# Patient Record
Sex: FEMALE | Race: ASIAN | NOT HISPANIC OR LATINO
[De-identification: names, ages, dates, MRNs, and addresses within clinical notes are randomized per-mention and may not be internally consistent; named-entity substitution may affect disease eponyms.]

---

## 2019-01-03 ENCOUNTER — RECORDS - HEALTHEAST (OUTPATIENT)
Dept: ADMINISTRATIVE | Facility: OTHER | Age: 54
End: 2019-01-03

## 2019-04-25 ENCOUNTER — RECORDS - HEALTHEAST (OUTPATIENT)
Dept: ADMINISTRATIVE | Facility: OTHER | Age: 54
End: 2019-04-25

## 2019-05-09 ENCOUNTER — RECORDS - HEALTHEAST (OUTPATIENT)
Dept: ADMINISTRATIVE | Facility: OTHER | Age: 54
End: 2019-05-09

## 2019-05-10 ENCOUNTER — RECORDS - HEALTHEAST (OUTPATIENT)
Dept: ADMINISTRATIVE | Facility: OTHER | Age: 54
End: 2019-05-10

## 2019-12-10 ENCOUNTER — OFFICE VISIT - HEALTHEAST (OUTPATIENT)
Dept: FAMILY MEDICINE | Facility: CLINIC | Age: 54
End: 2019-12-10

## 2019-12-10 DIAGNOSIS — M54.12 RIGHT CERVICAL RADICULOPATHY: ICD-10-CM

## 2019-12-10 DIAGNOSIS — I10 BENIGN ESSENTIAL HYPERTENSION: ICD-10-CM

## 2019-12-10 DIAGNOSIS — J30.89 NON-SEASONAL ALLERGIC RHINITIS, UNSPECIFIED TRIGGER: ICD-10-CM

## 2019-12-10 RX ORDER — TRAZODONE HYDROCHLORIDE 50 MG/1
TABLET, FILM COATED ORAL
Refills: 5 | Status: SHIPPED | COMMUNITY
Start: 2019-05-17 | End: 2022-02-02

## 2019-12-10 ASSESSMENT — MIFFLIN-ST. JEOR: SCORE: 1113.41

## 2019-12-11 ENCOUNTER — RECORDS - HEALTHEAST (OUTPATIENT)
Dept: ADMINISTRATIVE | Facility: OTHER | Age: 54
End: 2019-12-11

## 2020-02-11 ENCOUNTER — OFFICE VISIT - HEALTHEAST (OUTPATIENT)
Dept: FAMILY MEDICINE | Facility: CLINIC | Age: 55
End: 2020-02-11

## 2020-04-16 ENCOUNTER — OFFICE VISIT - HEALTHEAST (OUTPATIENT)
Dept: FAMILY MEDICINE | Facility: CLINIC | Age: 55
End: 2020-04-16

## 2020-04-16 DIAGNOSIS — R10.13 EPIGASTRIC PAIN: ICD-10-CM

## 2020-04-16 DIAGNOSIS — K59.04 CHRONIC IDIOPATHIC CONSTIPATION: ICD-10-CM

## 2020-04-16 RX ORDER — SUCRALFATE ORAL 1 G/10ML
1 SUSPENSION ORAL 4 TIMES DAILY
Qty: 420 ML | Refills: 1 | Status: SHIPPED | OUTPATIENT
Start: 2020-04-16 | End: 2022-02-02

## 2020-04-17 ENCOUNTER — COMMUNICATION - HEALTHEAST (OUTPATIENT)
Dept: FAMILY MEDICINE | Facility: CLINIC | Age: 55
End: 2020-04-17

## 2020-04-23 ENCOUNTER — OFFICE VISIT - HEALTHEAST (OUTPATIENT)
Dept: FAMILY MEDICINE | Facility: CLINIC | Age: 55
End: 2020-04-23

## 2020-04-23 ENCOUNTER — COMMUNICATION - HEALTHEAST (OUTPATIENT)
Dept: SCHEDULING | Facility: CLINIC | Age: 55
End: 2020-04-23

## 2020-04-23 DIAGNOSIS — R35.0 URINARY FREQUENCY: ICD-10-CM

## 2020-04-23 DIAGNOSIS — R10.13 EPIGASTRIC PAIN: ICD-10-CM

## 2020-04-23 DIAGNOSIS — R11.0 NAUSEA: ICD-10-CM

## 2020-04-23 LAB
ALBUMIN SERPL-MCNC: 4.5 G/DL (ref 3.5–5)
ALBUMIN UR-MCNC: NEGATIVE MG/DL
ALP SERPL-CCNC: 96 U/L (ref 45–120)
ALT SERPL W P-5'-P-CCNC: 26 U/L (ref 0–45)
ANION GAP SERPL CALCULATED.3IONS-SCNC: 10 MMOL/L (ref 5–18)
APPEARANCE UR: CLEAR
AST SERPL W P-5'-P-CCNC: 22 U/L (ref 0–40)
BACTERIA #/AREA URNS HPF: ABNORMAL HPF
BASOPHILS # BLD AUTO: 0.1 THOU/UL (ref 0–0.2)
BASOPHILS NFR BLD AUTO: 1 % (ref 0–2)
BILIRUB SERPL-MCNC: 0.7 MG/DL (ref 0–1)
BILIRUB UR QL STRIP: NEGATIVE
BUN SERPL-MCNC: 11 MG/DL (ref 8–22)
CALCIUM SERPL-MCNC: 9.7 MG/DL (ref 8.5–10.5)
CHLORIDE BLD-SCNC: 105 MMOL/L (ref 98–107)
CO2 SERPL-SCNC: 27 MMOL/L (ref 22–31)
COLOR UR AUTO: YELLOW
CREAT SERPL-MCNC: 0.71 MG/DL (ref 0.6–1.1)
EOSINOPHIL # BLD AUTO: 0.1 THOU/UL (ref 0–0.4)
EOSINOPHIL NFR BLD AUTO: 2 % (ref 0–6)
ERYTHROCYTE [DISTWIDTH] IN BLOOD BY AUTOMATED COUNT: 12.5 % (ref 11–14.5)
GFR SERPL CREATININE-BSD FRML MDRD: >60 ML/MIN/1.73M2
GLUCOSE BLD-MCNC: 87 MG/DL (ref 70–125)
GLUCOSE UR STRIP-MCNC: NEGATIVE MG/DL
HCT VFR BLD AUTO: 41.8 % (ref 35–47)
HGB BLD-MCNC: 13.3 G/DL (ref 12–16)
HGB UR QL STRIP: ABNORMAL
KETONES UR STRIP-MCNC: NEGATIVE MG/DL
LEUKOCYTE ESTERASE UR QL STRIP: NEGATIVE
LIPASE SERPL-CCNC: 37 U/L (ref 0–52)
LYMPHOCYTES # BLD AUTO: 2.5 THOU/UL (ref 0.8–4.4)
LYMPHOCYTES NFR BLD AUTO: 41 % (ref 20–40)
MCH RBC QN AUTO: 27.3 PG (ref 27–34)
MCHC RBC AUTO-ENTMCNC: 31.8 G/DL (ref 32–36)
MCV RBC AUTO: 86 FL (ref 80–100)
MONOCYTES # BLD AUTO: 0.3 THOU/UL (ref 0–0.9)
MONOCYTES NFR BLD AUTO: 5 % (ref 2–10)
NEUTROPHILS # BLD AUTO: 3.1 THOU/UL (ref 2–7.7)
NEUTROPHILS NFR BLD AUTO: 51 % (ref 50–70)
NITRATE UR QL: NEGATIVE
PH UR STRIP: 6.5 [PH] (ref 5–8)
PLATELET # BLD AUTO: 325 THOU/UL (ref 140–440)
PMV BLD AUTO: 10.1 FL (ref 8.5–12.5)
POTASSIUM BLD-SCNC: 3.5 MMOL/L (ref 3.5–5)
PROT SERPL-MCNC: 8.2 G/DL (ref 6–8)
RBC # BLD AUTO: 4.87 MILL/UL (ref 3.8–5.4)
RBC #/AREA URNS AUTO: ABNORMAL HPF
SODIUM SERPL-SCNC: 142 MMOL/L (ref 136–145)
SP GR UR STRIP: 1.01 (ref 1–1.03)
SQUAMOUS #/AREA URNS AUTO: ABNORMAL LPF
UROBILINOGEN UR STRIP-ACNC: ABNORMAL
WBC #/AREA URNS AUTO: ABNORMAL HPF
WBC: 6 THOU/UL (ref 4–11)

## 2020-04-23 RX ORDER — KETOROLAC TROMETHAMINE 5 MG/ML
SOLUTION OPHTHALMIC
Status: SHIPPED | COMMUNITY
Start: 2020-04-16 | End: 2024-05-22

## 2020-04-23 RX ORDER — PANTOPRAZOLE SODIUM 40 MG/1
TABLET, DELAYED RELEASE ORAL
Status: SHIPPED | COMMUNITY
Start: 2020-04-16 | End: 2022-02-02

## 2020-04-23 RX ORDER — SUMATRIPTAN 25 MG/1
TABLET, FILM COATED ORAL
Refills: 5 | Status: SHIPPED | COMMUNITY
Start: 2019-08-19 | End: 2022-02-02 | Stop reason: SINTOL

## 2020-05-05 ENCOUNTER — OFFICE VISIT - HEALTHEAST (OUTPATIENT)
Dept: FAMILY MEDICINE | Facility: CLINIC | Age: 55
End: 2020-05-05

## 2020-05-05 DIAGNOSIS — Z12.11 SPECIAL SCREENING FOR MALIGNANT NEOPLASMS, COLON: ICD-10-CM

## 2020-05-05 DIAGNOSIS — K21.9 GASTROESOPHAGEAL REFLUX DISEASE, ESOPHAGITIS PRESENCE NOT SPECIFIED: ICD-10-CM

## 2020-06-02 ENCOUNTER — RECORDS - HEALTHEAST (OUTPATIENT)
Dept: ADMINISTRATIVE | Facility: OTHER | Age: 55
End: 2020-06-02

## 2020-11-05 ENCOUNTER — OFFICE VISIT - HEALTHEAST (OUTPATIENT)
Dept: FAMILY MEDICINE | Facility: CLINIC | Age: 55
End: 2020-11-05

## 2020-11-05 DIAGNOSIS — R10.13 EPIGASTRIC PAIN: ICD-10-CM

## 2020-11-05 DIAGNOSIS — K59.04 CHRONIC IDIOPATHIC CONSTIPATION: ICD-10-CM

## 2020-11-05 LAB
ALBUMIN SERPL-MCNC: 4.8 G/DL (ref 3.5–5)
ALBUMIN UR-MCNC: NEGATIVE MG/DL
ALP SERPL-CCNC: 120 U/L (ref 45–120)
ALT SERPL W P-5'-P-CCNC: 19 U/L (ref 0–45)
ANION GAP SERPL CALCULATED.3IONS-SCNC: 12 MMOL/L (ref 5–18)
APPEARANCE UR: CLEAR
AST SERPL W P-5'-P-CCNC: 18 U/L (ref 0–40)
BACTERIA #/AREA URNS HPF: ABNORMAL HPF
BILIRUB SERPL-MCNC: 0.9 MG/DL (ref 0–1)
BILIRUB UR QL STRIP: NEGATIVE
BUN SERPL-MCNC: 13 MG/DL (ref 8–22)
CALCIUM SERPL-MCNC: 9.9 MG/DL (ref 8.5–10.5)
CHLORIDE BLD-SCNC: 104 MMOL/L (ref 98–107)
CO2 SERPL-SCNC: 26 MMOL/L (ref 22–31)
COLOR UR AUTO: YELLOW
CREAT SERPL-MCNC: 0.66 MG/DL (ref 0.6–1.1)
ERYTHROCYTE [DISTWIDTH] IN BLOOD BY AUTOMATED COUNT: 12.2 % (ref 11–14.5)
GFR SERPL CREATININE-BSD FRML MDRD: >60 ML/MIN/1.73M2
GLUCOSE BLD-MCNC: 90 MG/DL (ref 70–125)
GLUCOSE UR STRIP-MCNC: NEGATIVE MG/DL
HCT VFR BLD AUTO: 45.3 % (ref 35–47)
HGB BLD-MCNC: 14.8 G/DL (ref 12–16)
HGB UR QL STRIP: ABNORMAL
KETONES UR STRIP-MCNC: NEGATIVE MG/DL
LEUKOCYTE ESTERASE UR QL STRIP: NEGATIVE
LIPASE SERPL-CCNC: 31 U/L (ref 0–52)
MCH RBC QN AUTO: 27.8 PG (ref 27–34)
MCHC RBC AUTO-ENTMCNC: 32.6 G/DL (ref 32–36)
MCV RBC AUTO: 85 FL (ref 80–100)
MUCOUS THREADS #/AREA URNS LPF: ABNORMAL LPF
NITRATE UR QL: NEGATIVE
PH UR STRIP: 7 [PH] (ref 5–8)
PLATELET # BLD AUTO: 339 THOU/UL (ref 140–440)
PMV BLD AUTO: 7.3 FL (ref 7–10)
POTASSIUM BLD-SCNC: 3.9 MMOL/L (ref 3.5–5)
PROT SERPL-MCNC: 8.3 G/DL (ref 6–8)
RBC # BLD AUTO: 5.32 MILL/UL (ref 3.8–5.4)
RBC #/AREA URNS AUTO: ABNORMAL HPF
SODIUM SERPL-SCNC: 142 MMOL/L (ref 136–145)
SP GR UR STRIP: 1.02 (ref 1–1.03)
SQUAMOUS #/AREA URNS AUTO: ABNORMAL LPF
UROBILINOGEN UR STRIP-ACNC: ABNORMAL
WBC #/AREA URNS AUTO: ABNORMAL HPF
WBC: 5.5 THOU/UL (ref 4–11)

## 2020-11-05 RX ORDER — FAMOTIDINE 20 MG/1
20 TABLET, FILM COATED ORAL 2 TIMES DAILY PRN
Qty: 60 TABLET | Refills: 3 | Status: SHIPPED | OUTPATIENT
Start: 2020-11-05 | End: 2022-02-02

## 2020-11-05 RX ORDER — AMOXICILLIN 250 MG
CAPSULE ORAL
Qty: 60 TABLET | Refills: 1 | Status: SHIPPED | OUTPATIENT
Start: 2020-11-05 | End: 2022-02-02

## 2020-11-05 ASSESSMENT — MIFFLIN-ST. JEOR: SCORE: 1095.21

## 2020-12-14 ENCOUNTER — COMMUNICATION - HEALTHEAST (OUTPATIENT)
Dept: FAMILY MEDICINE | Facility: CLINIC | Age: 55
End: 2020-12-14

## 2020-12-14 DIAGNOSIS — M54.12 RIGHT CERVICAL RADICULOPATHY: ICD-10-CM

## 2020-12-15 RX ORDER — ACETAMINOPHEN 500 MG
TABLET ORAL
Qty: 100 TABLET | Refills: 5 | Status: SHIPPED | OUTPATIENT
Start: 2020-12-15 | End: 2022-02-02

## 2020-12-21 ENCOUNTER — COMMUNICATION - HEALTHEAST (OUTPATIENT)
Dept: FAMILY MEDICINE | Facility: CLINIC | Age: 55
End: 2020-12-21

## 2020-12-21 DIAGNOSIS — I10 BENIGN ESSENTIAL HYPERTENSION: ICD-10-CM

## 2020-12-21 DIAGNOSIS — M54.12 RIGHT CERVICAL RADICULOPATHY: ICD-10-CM

## 2020-12-23 ENCOUNTER — COMMUNICATION - HEALTHEAST (OUTPATIENT)
Dept: FAMILY MEDICINE | Facility: CLINIC | Age: 55
End: 2020-12-23

## 2020-12-23 DIAGNOSIS — J30.89 NON-SEASONAL ALLERGIC RHINITIS, UNSPECIFIED TRIGGER: ICD-10-CM

## 2020-12-23 RX ORDER — GABAPENTIN 300 MG/1
CAPSULE ORAL
Qty: 30 CAPSULE | Refills: 11 | Status: SHIPPED | OUTPATIENT
Start: 2020-12-23 | End: 2022-02-02

## 2020-12-23 RX ORDER — LOSARTAN POTASSIUM 50 MG/1
TABLET ORAL
Qty: 30 TABLET | Refills: 11 | Status: SHIPPED | OUTPATIENT
Start: 2020-12-23 | End: 2022-02-02

## 2020-12-26 RX ORDER — CETIRIZINE HYDROCHLORIDE 10 MG/1
10 TABLET ORAL DAILY
Qty: 90 TABLET | Refills: 3 | Status: SHIPPED | OUTPATIENT
Start: 2020-12-26 | End: 2022-02-02

## 2021-03-06 ENCOUNTER — IMMUNIZATION (OUTPATIENT)
Dept: NURSING | Facility: CLINIC | Age: 56
End: 2021-03-06
Payer: COMMERCIAL

## 2021-03-06 PROCEDURE — 0031A PR COVID VAC JANSSEN AD26 0.5ML: CPT

## 2021-03-06 PROCEDURE — 91303 PR COVID VAC JANSSEN AD26 0.5ML: CPT

## 2021-04-10 ENCOUNTER — HEALTH MAINTENANCE LETTER (OUTPATIENT)
Age: 56
End: 2021-04-10

## 2021-05-19 DIAGNOSIS — Z71.83 ENCOUNTER FOR NONPROCREATIVE GENETIC COUNSELING: Primary | ICD-10-CM

## 2021-05-31 ENCOUNTER — RECORDS - HEALTHEAST (OUTPATIENT)
Dept: ADMINISTRATIVE | Facility: CLINIC | Age: 56
End: 2021-05-31

## 2021-06-03 VITALS
HEART RATE: 69 BPM | DIASTOLIC BLOOD PRESSURE: 82 MMHG | TEMPERATURE: 98.6 F | OXYGEN SATURATION: 98 % | HEIGHT: 58 IN | WEIGHT: 140 LBS | RESPIRATION RATE: 20 BRPM | SYSTOLIC BLOOD PRESSURE: 120 MMHG | BODY MASS INDEX: 29.39 KG/M2

## 2021-06-04 VITALS
WEIGHT: 136 LBS | OXYGEN SATURATION: 97 % | DIASTOLIC BLOOD PRESSURE: 96 MMHG | TEMPERATURE: 98.4 F | BODY MASS INDEX: 28.55 KG/M2 | RESPIRATION RATE: 24 BRPM | HEART RATE: 70 BPM | HEIGHT: 58 IN | SYSTOLIC BLOOD PRESSURE: 140 MMHG

## 2021-06-04 VITALS
HEART RATE: 68 BPM | TEMPERATURE: 97.9 F | DIASTOLIC BLOOD PRESSURE: 92 MMHG | BODY MASS INDEX: 27.8 KG/M2 | RESPIRATION RATE: 16 BRPM | SYSTOLIC BLOOD PRESSURE: 158 MMHG | WEIGHT: 132.6 LBS | OXYGEN SATURATION: 99 %

## 2021-06-04 NOTE — PROGRESS NOTES
ASSESSMENT/PLAN:    1. Right cervical radiculopathy  History and exam are consistent with a cervical radiculopathy.  We discussed the possibility of checking imaging versus to try to treat symptomatically and hold off on imaging until clinical response is seen.  Patient prefers to try medications first.  We will try her on gabapentin, follow-up in about 2 months, although she could call if she is having a lot of trouble sooner than that and I can order an MRI.  - gabapentin (NEURONTIN) 300 MG capsule; Take 1 capsule (300 mg total) by mouth at bedtime.  Dispense: 30 capsule; Refill: 11  - acetaminophen (TYLENOL) 500 MG tablet; Take 2 tablets (1,000 mg total) by mouth every 4 (four) hours as needed for pain.  Dispense: 100 tablet; Refill: 5    2. Benign essential hypertension  Hypertension well-controlled on current dose of losartan from previous provider.  Will continue.  - losartan (COZAAR) 50 MG tablet; Take 1 tablet (50 mg total) by mouth daily.  Dispense: 30 tablet; Refill: 11    3. Non-seasonal allergic rhinitis, unspecified trigger  Well-controlled on cetirizine from previous provider, will continue.  - cetirizine (ZYRTEC) 10 MG tablet; Take 1 tablet (10 mg total) by mouth daily.  Dispense: 30 tablet; Refill: 11       Return in about 2 months (around 2/10/2020) for Wellness Visit/Healthcare Maintainance Visit.     SUBJECTIVE:  Se Menendez is a 54 y.o. female here to establish care and for some right neck/jaw pain.    Right jaw pain x3 week, radiates to chest.  Tylenol helps temporarily.  Never had before  Not exertional.  When severe radiates neck and head, can't turn neck.  She is had this a little bit in the past, but it is never been as bad or lasted as long as it is currently.  There is may be some symptoms into the right arm as well.    She has chronic hypertension and allergies, is otherwise pretty healthy.    ROS:  Remainder review of systems is negative unless previously stated    The following portions of  "the patient's history were reviewed and updated as appropriate: allergies, current medications and problem list.    Patient Active Problem List   Diagnosis     Benign essential hypertension     Non-seasonal allergic rhinitis        Past Surgical History:   Procedure Laterality Date     LAPAROSCOPIC SUPRACERVICAL HYSTERECTOMY  02/25/2010      Family History   Problem Relation Age of Onset     Diabetes Neg Hx      Stroke Neg Hx      Acute Myocardial Infarction Neg Hx       Social History     Social History Narrative     Not on file      Social History     Tobacco Use   Smoking Status Never Smoker   Smokeless Tobacco Never Used       Current Outpatient Medications on File Prior to Visit   Medication Sig     acetaminophen (TYLENOL) 500 MG tablet Take 2 tablets by mouth.     cetirizine (ZYRTEC) 10 MG tablet Take 10 mg by mouth daily.     losartan (COZAAR) 50 MG tablet Take 50 mg by mouth daily.     traZODone (DESYREL) 50 MG tablet TK 1 T PO QHS     No current facility-administered medications on file prior to visit.         Social History     Tobacco Use   Smoking Status Never Smoker   Smokeless Tobacco Never Used       OBJECTIVE:  :  /82   Pulse 69   Temp 98.6  F (37  C) (Oral)   Resp 20   Ht 4' 9.91\" (1.471 m)   Wt 140 lb (63.5 kg)   SpO2 98%   BMI 29.35 kg/m      Gen:  A&A, NAD  HEENT: Bilateral ear canals clear, pearly gray tympanic membranes.  Oropharynx pink moist and benign.  Neck:  Mildly stiff in all directions.  CV:  HRRR, no M/R/G  Resp:  CTAB  Ext:  W&D, no edema  Neuro: normal strength and sensation upper extremities          "

## 2021-06-07 NOTE — PROGRESS NOTES
"Se Menendez is a 54 y.o. female who is being evaluated via a billable telephone visit.      The patient has been notified of following:     \"This telephone visit will be conducted via a call between you and your physician/provider. We have found that certain health care needs can be provided without the need for a physical exam.  This service lets us provide the care you need with a short phone conversation.  If a prescription is necessary we can send it directly to your pharmacy.  If lab work is needed we can place an order for that and you can then stop by our lab to have the test done at a later time.    Telephone visits are billed at different rates depending on your insurance coverage. During this emergency period, for some insurers they may be billed the same as an in-person visit.  Please reach out to your insurance provider with any questions.    If during the course of the call the physician/provider feels a telephone visit is not appropriate, you will not be charged for this service.\"    Patient has given verbal consent to a Telephone visit? Yes    Patient would like to receive their AVS by AVS Preference: Mail a copy.    Additional provider notes:     Chronic epigastric Abdominal pain, on/off but worse this week  Radiates to neck  Burning sensation on the chest  Bitter taste in the mouth, with regurgitation of liquid  Afraid to eat because of symptoms  No dysphagia  No hematemesis  No melena  No fever  No vomiting  Has been taking protonix 40mg intermittently.  Recently stopped because she didn't think it was working  Chronic constipation that improves only with use of OTC    Assessment/Plan:  1. Epigastric pain  I advised restarting [id number] and to increase to two times a day dosing  Will add sucralfate  Call if not better in 1 wk or if with worsening symptoms  - sucralfate (CARAFATE) 100 mg/mL suspension; Take 10 mL (1 g total) by mouth 4 (four) times a day.  Dispense: 420 mL; Refill: 1    2. Chronic " idiopathic constipation  Trial senokot  Advised fiber, fluid  - senna-docusate (SENOKOT-S) 8.6-50 mg tablet; Take 2 tabs once daily  Dispense: 60 tablet; Refill: 1    Phone call duration:  25 minutes    Reji Dawson MD

## 2021-06-07 NOTE — TELEPHONE ENCOUNTER
Patient calling today as symptoms are not relieving after last appt with provider on  4/16/20.  Call to [facility] to verify script that was dispensed for CARAFATE.     Patient reports pain as 7-8 /10  Has not had mush relief with protonix two times a day or stool softner daily  Last bowl movement was on today they were smaller.  Patient states she is eating very little  Because she feel she is going to vomit if she eats.    Call to [facility] walk in and they suggest an actual appt to be scheduled in the walk in clinic. appt set for  Today @ 330    COVID 19 Nurse Triage Plan/Patient Instructions    Please be aware that novel coronavirus (COVID-19) may be circulating in the community. If you develop symptoms such as fever, cough, or SOB or if you have concerns about the presence of another infection including coronavirus (COVID-19), please contact your health care provider or visit [email or web address].     Disposition/Instructions    Patient to have an Urgent Care Telephone Visit with a provider. Follow System Ambulatory Workflow for COVID 19.     Urgent Care Telephone Visits are available between the hours of 8 am to 9 pm. Staff will assist patent in scheduling an appointment for this Urgent Care Telephone Visit.     Call Back If: Your symptoms worsen before you are able to complete your Urgent Care Telephone Visit with a provider.        Thank you for limiting contact with others, wearing a simple mask to cover your cough, practice good hand hygiene habits and accessing our virtual services where possible to limit the spread of this virus.    For more information about COVID19 and options for caring for yourself at home, please visit the CDC website at https://www.cdc.gov/coronavirus/2019-ncov/about/steps-when-sick.html  For more options for care at [facility], please visit our website at [email or web address]    For more information, please use the [facility]  COVID-19 Website: https://www.health.[facility]mn.us/diseases/coronavirus/index.html  [place] Department of Health ([facility]) COVID-19 Hotlines (Interpreters available):      Health questions: Phone Number: [phone] or [phone] and Hours: 7 a.m. to 7 p.m.    Schools and  questions: Phone Number: [phone] or [phone] and Hours 7 a.m. to 7 p.m.

## 2021-06-07 NOTE — TELEPHONE ENCOUNTER
Reached out to the pharmacy and relayed the below message. No additional questions at this time. Juliet Vale

## 2021-06-07 NOTE — PROGRESS NOTES
Chief Complaint   Patient presents with     Abdominal Pain     x 20 days, started upper abdomen and now all over stomach, constipated x 1 month, no fever or cough       ASSESSMENT & PLAN:   Diagnoses and all orders for this visit:    Epigastric pain  -     Comprehensive Metabolic Panel  -     HM1(CBC and Differential)  -     Lipase  -     US Abdomen Limited; Future; Expected date: 04/23/2020  -      Abdomen Limited  -     HM1 (CBC with Diff)  -     famotidine (PEPCID) 20 MG tablet; Take 1 tablet (20 mg total) by mouth 2 (two) times a day.  Dispense: 60 tablet; Refill: 0    Urinary frequency  -     Urinalysis-UC if Indicated    Nausea  -     ondansetron (ZOFRAN) 4 MG tablet; Take 1 tablet (4 mg total) by mouth every 8 (eight) hours as needed for nausea.  Dispense: 15 tablet; Refill: 0        MDM:  Patient with history of constipation and presumed GERD/gastritis.  Today, abdomen is soft and only epigastric area is tender.  Significant difficulty eating as a result of pain.  Is on twice daily Protonix which has been ineffective.  Has never been tested for H. pylori nor had upper GI.  Is in no visible distress in clinic.    Constipation does not seem to be etiology today.  Multiple labs and ultrasound were negative for acute issue.  Fatty liver with normal LFTs noted.    Has been taking daily stimulant laxatives due to small BMs, but she has not been eating much.  Recommended holding off for now as abdomen is soft and she has absolutely no pain elsewhere other than epigastric.     Okay for Tylenol as needed.    Patient should probably have H. pylori test.  Advised stopping Protonix and switching to Pepcid for now to facilitate accurate testing.      Do not believe she has cholecystitis, cholelithiasis, pancreatitis, significant bleeding ulcer with normal hemoglobin, obstruction as she has a soft abdomen.     Zofran for nausea to facilitate eating.  [place] diet.  Clear liquids.  Should follow-up next week with primary  "care provider.  Should go to ER with intolerable pain, inability to eat due to severe pain, frequent vomiting, decreased urination.     Wrote out detailed instructions.  Patient states she has English-speaking family members to help her review instructions.    Greater than 45 minutes spent with patient, including detailed review of chart and discussion with patient.     Supportive care discussed.  See discharge instructions below for specific recommendations given.    At the end of the encounter, I discussed results, diagnosis, medications. Discussed red flags for immediate return to clinic/ER, as well as indications for follow up if no improvement. Patient and/or caregiver understood and agreed to plan. Patient was stable for discharge.    SUBJECTIVE    HPI:  HPI  Se Menendez presents to the walk-in clinic with   Chief Complaint   Patient presents with     Abdominal Pain     x 20 days, started upper abdomen and now all over stomach, constipated x 1 month, no fever or cough     Patient here in urgent care as a follow-up from phone visit from April 16 with primary care provider for persistent constipation and epigastric pain.      Has chronic constipation and epigastric pain.  Has been taking Protonix twice daily as well as Carafate 4 times daily.      Is been using Senokot 2 tablets daily     Had small BM last night and this morning at 1100.  Not hard.     Epigastric pain is area of worst pain, shoots through to the back.   Worse with eating, better without eating.  Hurts all the time to some degree.      No vomiting.  Nauseated with eating.  Has not eaten today at all.  Pain is 7 out of 10 which is \"good\" for her as she has not eaten.    No history of gallbladder surgery.  No history of H. pylori.  No black or bloody stools.    1. Epigastric pain  I advised restarting [id number] and to increase to two times a day dosing  Will add sucralfate  Call if not better in 1 wk or if with worsening symptoms  - sucralfate " (CARAFATE) 100 mg/mL suspension; Take 10 mL (1 g total) by mouth 4 (four) times a day.  Dispense: 420 mL; Refill: 1     2. Chronic idiopathic constipation  Trial senokot  Advised fiber, fluid  - senna-docusate (SENOKOT-S) 8.6-50 mg tablet; Take 2 tabs once daily  Dispense: 60 tablet; Refill: 1    Denies: Black or bloody stools, history of H. pylori, other family members with H. pylori, known gallbladder disease, abdominal surgery, fevers, significant vomiting.     See ROS for additional symptoms and/or pertinent negatives.     Due to language barrier, an  was present during the history-taking and subsequent discussion (and for part of the physical exam) with this patient.      History obtained from the patient.    No past medical history on file.    Active Ambulatory (Non-Hospital) Problems    Diagnosis     Benign essential hypertension     Non-seasonal allergic rhinitis       Family History   Problem Relation Age of Onset     Diabetes Neg Hx      Stroke Neg Hx      Acute Myocardial Infarction Neg Hx        Social History     Tobacco Use     Smoking status: Never Smoker     Smokeless tobacco: Never Used   Substance Use Topics     Alcohol use: Not on file       Review of Systems   Constitutional: Negative for fever.   HENT: Negative for sore throat.    Gastrointestinal: Positive for abdominal pain, constipation and nausea. Negative for anal bleeding, diarrhea and vomiting.       OBJECTIVE    Vitals:    04/23/20 1519 04/23/20 1523   BP: (!) 188/81 (!) 158/92   Pulse: 68    Resp: 16    Temp: 97.9  F (36.6  C)    TempSrc: Oral    SpO2: 99%    Weight: 132 lb 9.6 oz (60.1 kg)        Physical Exam  Constitutional:       General: She is not in acute distress.     Appearance: She is well-developed.   HENT:      Right Ear: External ear normal.      Left Ear: External ear normal.      Nose: No congestion or rhinorrhea.   Eyes:      General:         Right eye: No discharge.         Left eye: No discharge.       Conjunctiva/sclera: Conjunctivae normal.   Cardiovascular:      Rate and Rhythm: Normal rate.   Pulmonary:      Effort: Pulmonary effort is normal.   Abdominal:      General: There is no distension.      Tenderness: There is abdominal tenderness (epigastric ). There is no right CVA tenderness, left CVA tenderness, guarding or rebound.      Hernia: No hernia is present.   Musculoskeletal: Normal range of motion.   Skin:     General: Skin is warm and dry.      Capillary Refill: Capillary refill takes less than 2 seconds.   Neurological:      Mental Status: She is alert and oriented to person, place, and time.   Psychiatric:         Mood and Affect: Mood normal.         Behavior: Behavior normal.         Thought Content: Thought content normal.         Judgment: Judgment normal.         Labs:  Recent Results (from the past 240 hour(s))   Comprehensive Metabolic Panel   Result Value Ref Range    Sodium 142 136 - 145 mmol/L    Potassium 3.5 3.5 - 5.0 mmol/L    Chloride 105 98 - 107 mmol/L    CO2 27 22 - 31 mmol/L    Anion Gap, Calculation 10 5 - 18 mmol/L    Glucose 87 70 - 125 mg/dL    BUN 11 8 - 22 mg/dL    Creatinine 0.71 0.60 - 1.10 mg/dL    GFR MDRD Af Amer >60 >60 mL/min/1.73m2    GFR MDRD Non Af Amer >60 >60 mL/min/1.73m2    Bilirubin, Total 0.7 0.0 - 1.0 mg/dL    Calcium 9.7 8.5 - 10.5 mg/dL    Protein, Total 8.2 (H) 6.0 - 8.0 g/dL    Albumin 4.5 3.5 - 5.0 g/dL    Alkaline Phosphatase 96 45 - 120 U/L    AST 22 0 - 40 U/L    ALT 26 0 - 45 U/L   Lipase   Result Value Ref Range    Lipase 37 0 - 52 U/L   HM1 (CBC with Diff)   Result Value Ref Range    WBC 6.0 4.0 - 11.0 thou/uL    RBC 4.87 3.80 - 5.40 mill/uL    Hemoglobin 13.3 12.0 - 16.0 g/dL    Hematocrit 41.8 35.0 - 47.0 %    MCV 86 80 - 100 fL    MCH 27.3 27.0 - 34.0 pg    MCHC 31.8 (L) 32.0 - 36.0 g/dL    RDW 12.5 11.0 - 14.5 %    Platelets 325 140 - 440 thou/uL    MPV 10.1 8.5 - 12.5 fL    Neutrophils % 51 50 - 70 %    Lymphocytes % 41 (H) 20 - 40 %     Monocytes % 5 2 - 10 %    Eosinophils % 2 0 - 6 %    Basophils % 1 0 - 2 %    Neutrophils Absolute 3.1 2.0 - 7.7 thou/uL    Lymphocytes Absolute 2.5 0.8 - 4.4 thou/uL    Monocytes Absolute 0.3 0.0 - 0.9 thou/uL    Eosinophils Absolute 0.1 0.0 - 0.4 thou/uL    Basophils Absolute 0.1 0.0 - 0.2 thou/uL   Urinalysis-UC if Indicated   Result Value Ref Range    Color, UA Yellow Colorless, Yellow, Straw, Light Yellow    Clarity, UA Clear Clear    Glucose, UA Negative Negative    Bilirubin, UA Negative Negative    Ketones, UA Negative Negative    Specific Gravity, UA 1.015 1.005 - 1.030    Blood, UA Trace (!) Negative    pH, UA 6.5 5.0 - 8.0    Protein, UA Negative Negative mg/dL    Urobilinogen, UA 0.2 E.U./dL 0.2 E.U./dL, 1.0 E.U./dL    Nitrite, UA Negative Negative    Leukocytes, UA Negative Negative    Bacteria, UA None Seen None Seen hpf    RBC, UA 3-5 (!) None Seen, 0-2 hpf    WBC, UA 0-5 None Seen, 0-5 hpf    Squam Epithel, UA 0-5 None Seen, 0-5 lpf         Radiology:    Us Abdomen Limited    Result Date: 4/23/2020  EXAM DATE:         04/23/2020 EXAM: US ABDOMEN LIMITED LOCATION: [facility] DATE/TIME: 4/23/2020 4:00 PM INDICATION: epigastric pain, worse with eating - check pancreas, GB COMPARISON: 4/5/2016 ultrasound TECHNIQUE: Limited abdominal ultrasound. FINDINGS: GALLBLADDER: Normal. No gallstones, wall thickening, or pericholecystic fluid. Negative sonographic Min's sign. BILE DUCTS: No biliary dilatation. The common duct measures 5 mm. LIVER: Diffuse increased echogenicity compatible with steatosis. No focal mass. RIGHT KIDNEY: No hydronephrosis. PANCREAS: The visualized portions are normal. No ascites. IMPRESSION: 1.  Diffuse hepatic steatosis. 2.  Normal-appearing pancreas and gallbladder.       PATIENT INSTRUCTIONS:   Patient Instructions   Stop pantoprazole  Start famotidine for stomach acid twice daily.   Ok to keep taking carafate    If you need testing for H. Pylori, it is better to  not be on pantoprazole when you get tested for at least 2 weeks for this because pantoprazole interferes with the test needed.     Ondansetron for nausea.  Take 30 minutes before meals as needed.      You probably don't need the senna anymore. Take only as needed if no bowel movements and lower stomach pain.     Eat only bland diet, no spicy food.  For example, chicken/rice/vegetables okay. Small, frequent meals.  Clear liquids like apple juice.      We will call if your lab results require immediate action.     Otherwise, recheck with your clinic early next week for further testing/advice. Go to ER if pain is not tolerable or you absolutely can't eat due to pain sooner.

## 2021-06-07 NOTE — PROGRESS NOTES
"Se Menendez is a 54 y.o. female who is being evaluated via a billable telephone visit.      The patient has been notified of following:     \"This telephone visit will be conducted via a call between you and your physician/provider. We have found that certain health care needs can be provided without the need for a physical exam.  This service lets us provide the care you need with a short phone conversation.  If a prescription is necessary we can send it directly to your pharmacy.  If lab work is needed we can place an order for that and you can then stop by our lab to have the test done at a later time.    Telephone visits are billed at different rates depending on your insurance coverage. During this emergency period, for some insurers they may be billed the same as an in-person visit.  Please reach out to your insurance provider with any questions.    If during the course of the call the physician/provider feels a telephone visit is not appropriate, you will not be charged for this service.\"    Patient has given verbal consent to a Telephone visit? Yes    What phone number would you like to be contacted at? [phone]    Patient would like to receive their AVS by AVS Preference: Audra.    Additional provider notes:     Se Menendez 54 y.o. female presented for telephone encounter.    Chief Complaint   Patient presents with     Abdominal Pain     x over 1 month, pain all over stomach, feels like a burning sensation        Patient Active Problem List   Diagnosis     Benign essential hypertension     Non-seasonal allergic rhinitis      Current Outpatient Medications on File Prior to Visit   Medication Sig Dispense Refill     acetaminophen (TYLENOL) 500 MG tablet Take 2 tablets (1,000 mg total) by mouth every 4 (four) hours as needed for pain. 100 tablet 5     cetirizine (ZYRTEC) 10 MG tablet Take 1 tablet (10 mg total) by mouth daily. 30 tablet 11     famotidine (PEPCID) 20 MG tablet Take 1 tablet (20 mg total) by mouth 2 " (two) times a day. 60 tablet 0     gabapentin (NEURONTIN) 300 MG capsule Take 1 capsule (300 mg total) by mouth at bedtime. 30 capsule 11     ketorolac (ACULAR) 0.5 % ophthalmic solution INSTILL 1 DROP OU BID FOR 30 DAYS       lansoprazole (PREVACID) 30 MG capsule Take 30 mg by mouth.       losartan (COZAAR) 50 MG tablet Take 1 tablet (50 mg total) by mouth daily. 30 tablet 11     meloxicam (MOBIC) 15 MG tablet TK 1 T PO ONCE D PRN P       nystatin (MYCOSTATIN) 100,000 unit/mL suspension SOAK DENTURES WITH 10 ML OUT OF MOUTH AND DISPOSE OF USED LIQUID       ondansetron (ZOFRAN) 4 MG tablet Take 1 tablet (4 mg total) by mouth every 8 (eight) hours as needed for nausea. 15 tablet 0     pantoprazole (PROTONIX) 40 MG tablet        senna-docusate (SENOKOT-S) 8.6-50 mg tablet Take 2 tabs once daily 60 tablet 1     sucralfate (CARAFATE) 100 mg/mL suspension Take 10 mL (1 g total) by mouth 4 (four) times a day. 420 mL 1     SUMAtriptan (IMITREX) 25 MG tablet TK 1 T PO TO PREVENT A MIGRAINE. REPEAT IN 2 HOURS IF SYMPTOMS PERSIST  5     traZODone (DESYREL) 50 MG tablet TK 1 T PO QHS  5     No current facility-administered medications on file prior to visit.       Past Surgical History:   Procedure Laterality Date     LAPAROSCOPIC SUPRACERVICAL HYSTERECTOMY  02/25/2010     Family History   Problem Relation Age of Onset     Diabetes Neg Hx      Stroke Neg Hx      Acute Myocardial Infarction Neg Hx        S:  54 y.o. female   F/u call  Was seen at [facility] with unremarkable labs & US (except for fatty liver).  Given famotidine and zofran.    Still with burning of the epigastrium and on/off abdominal pain  Worse with eating  Still constipation    In addition to famotidine and zofran, she is still taking   protonix 40mg two times a day which she finds to slightly help  Sucralfate, 1 tab four times a day - no improvement  Senna 2 tab every 6 hrs - no improvement  Long h/o GERD.  Had Endoscopy 2013 but results are not available for  review  No NSAIDs  No vomiting  No fever  No bleeding mouth/nose/urine/stool    Never had colonoscopy    O:  Patient sound alert, coherent, and not in distress    Assessment/Plan:  1. Gastroesophageal reflux disease, esophagitis presence not specified  Worsening on PPI two times a day and H2 blocker two times a day  Will get endoscopy, also need to r/o H pylori infection  I asked her to f/u with PCP for face-face visit  Continue with PPI two times a day and H2 blocker BID  - Ambulatory referral for Upper GI Endoscopy    2. Constipation  Add miralax and take with senokot  Increase fluid and fiber  Colonoscopy for constipation w/u and colon cancer screening  - Ambulatory referral for Colonoscopy      Phone call duration:  25 minutes    Reji Dawson MD

## 2021-06-07 NOTE — TELEPHONE ENCOUNTER
Drug Change Request  Who is calling?:  [facility]  What is the current medication?:  Sucralfate 1 gm/10 ml suspension  What alternative is being requested?: None given  Why the request to change?:  Medication is not covered by insurance  Requested Pharmacy?: [facility]  Okay to leave a detailed message?:  No

## 2021-06-07 NOTE — PATIENT INSTRUCTIONS - HE
Stop pantoprazole  Start famotidine for stomach acid twice daily.   Ok to keep taking carafate    If you need testing for H. Pylori, it is better to not be on pantoprazole when you get tested for at least 2 weeks for this because pantoprazole interferes with the test needed.     Ondansetron for nausea.  Take 30 minutes before meals as needed.      You probably don't need the senna anymore. Take only as needed if no bowel movements and lower stomach pain.     Eat only bland diet, no spicy food.  For example, chicken/rice/vegetables okay. Small, frequent meals.  Clear liquids like apple juice.      We will call if your lab results require immediate action.     Otherwise, recheck with your clinic early next week for further testing/advice. Go to ER if pain is not tolerable or you absolutely can't eat due to pain sooner.

## 2021-06-12 NOTE — PROGRESS NOTES
"uaSUBJECTIVE  Se Menendez is a 55 y.o. female here for:    Chief Complaint   Patient presents with     Abdominal Pain     upper middle X 8 months (on going) (worsen X 3 days)      Had endoscopy and colonoscopy 6/2/20 that was unremarkable except grade 1 internal hemorrhoids, hiatal hernia, GERD without esophagitis.   Recommended to continue pantoprazole 40 mg daily (she takes this everyday- and shows me picture)  Medications help but do not take it away completely  Epigastric discomfort worse the last 3 days  Worse with food- solid food  Better with softer foods.  Drinking normally  Normal urination  +Constipation, no blood in stool  Needs refill of stool softener  Drinks lots of water  Eats lots of fruits and vegetables.  No fever or nausea or vomiting.  No EtOH or ibuprofen/NSAIDS  She is wondering if she can try something else to help with her stomach- previously also was on famotidine and sucralfate- she wants to try famotidine again    Did not take her BP medication today  Does not need refill      ROS  Complete 10 point review of systems negative except as noted above in HPI    Reviewed Past Medical History, Medications, Family History and Social History in Epic and up to date with no new changes.    OBJECTIVE  BP (!) 140/96   Pulse 70   Temp 98.4  F (36.9  C) (Oral)   Resp 24   Ht 4' 9.91\" (1.471 m)   Wt 136 lb (61.7 kg)   SpO2 97%   BMI 28.51 kg/m       General: Cooperative, pleasant, in no acute distress  CV: RRR, normal S1/S2, no murmur, rubs, gallops  Resp: No respiratory distress. Clear to auscultation bilaterally. No wheezes, rales, rhonchi  Abd: Soft, mildly tender to deep palpation in epigastric region, no rebound or guarding, BS normal    LABS & IMAGES   Pending at time of note    ASSESSMENT/PLAN:    was seen today for abdominal pain.    Diagnoses and all orders for this visit:    Epigastric pain: chronic issue but worse in the last 3 days. Reviewed history of medications that she has tried, " workup including ultrasound, referral to GI for endoscopy/colonoscopy. She does have small hiatal hernia and GERD and grade 1 internal hemorrhoids seen on endoscopy/colonoscopy in early June- reviewed results with patient. Encouraged increasing dietary fiber, adequate water intake, and she needs refill on her stool softener. I also discussed GERD and nonpharmacologic treatment options- she will continue her pantoprazole 40 mg daily- as directed by GI provider and will try adding back famotidine to her regimen to see if this impoves. She does not drink EtOH or use NSAIDS- only tylenol for pain as needed. Check labs.   -     famotidine (PEPCID) 20 MG tablet; Take 1 tablet (20 mg total) by mouth 2 (two) times a day as needed for heartburn.  -     senna-docusate (SENOKOT-S) 8.6-50 mg tablet; Take 2 tabs once daily  -     Comprehensive Metabolic Panel  -     HM2(CBC w/o Differential)  -     Lipase  -     Urinalysis-UC if Indicated      Follow-up in 1 year for annual physical    Visit was completed along with [facility]     Options for treatment and follow-up care were reviewed with the patient. Se Gemma and/or guardian was engaged and actively involved in the decision making process. Se Gemma and/or guardian verbalized understanding of the options discussed and was satisfied with the final plan.      Robyn Iqbal MD

## 2021-06-13 NOTE — TELEPHONE ENCOUNTER
RN cannot approve Refill Request    RN can NOT refill this medication med is not covered by policy/route to provider. Last office visit: 2/11/2020 Cierra Stratton MD Last Physical: Visit date not found Last MTM visit: Visit date not found Last visit same specialty: 11/5/2020 Robyn Iqbal MD.  Next visit within 3 mo: Visit date not found  Next physical within 3 mo: Visit date not found      Porsche Carroll, [facility] Triage/Med Refill 12/15/2020    Requested Prescriptions   Pending Prescriptions Disp Refills     acetaminophen (TYLENOL) 500 MG tablet [Pharmacy Med Name: ACETAMINOPHEN 500MG E/S CAPLETS] 100 tablet 5     Sig: TAKE 2 TABLETS(1000 MG) BY MOUTH EVERY 4 HOURS AS NEEDED FOR PAIN       There is no refill protocol information for this order

## 2021-06-14 NOTE — TELEPHONE ENCOUNTER
Refill Approved    Rx renewed per Medication Renewal Policy. Medication was last renewed on 12/10/19, last OV 11/5/20.    Radha Ibarra, [facility] Triage/Med Refill 12/26/2020     Requested Prescriptions   Pending Prescriptions Disp Refills     cetirizine (ZYRTEC) 10 MG tablet [Pharmacy Med Name: CETIRIZINE 10MG TABLETS] 30 tablet 11     Sig: TAKE 1 TABLET(10 MG) BY MOUTH DAILY       Antihistamine Refill Protocol Passed - 12/23/2020  2:00 PM        Passed - Patient has had office visit/physical in last year     Last office visit with prescriber/PCP: 2/11/2020 Cierra Stratton MD OR same dept: 11/5/2020 Robyn Iqbal MD OR same specialty: 11/5/2020 Robyn Iqbal MD  Last physical: Visit date not found Last MTM visit: Visit date not found   Next visit within 3 mo: Visit date not found  Next physical within 3 mo: Visit date not found  Prescriber OR PCP: Cierra Stratton MD  Last diagnosis associated with med order: 1. Non-seasonal allergic rhinitis, unspecified trigger  - cetirizine (ZYRTEC) 10 MG tablet [Pharmacy Med Name: CETIRIZINE 10MG TABLETS]; TAKE 1 TABLET(10 MG) BY MOUTH DAILY  Dispense: 30 tablet; Refill: 11    If protocol passes may refill for 12 months if within 3 months of last provider visit (or a total of 15 months).

## 2021-06-14 NOTE — TELEPHONE ENCOUNTER
Refill Approved    Rx renewed per Medication Renewal Policy. Medication was last renewed on 12/10/2019.    Porsche Carroll, [facility] Triage/Med Refill 12/23/2020     Requested Prescriptions   Pending Prescriptions Disp Refills     gabapentin (NEURONTIN) 300 MG capsule [Pharmacy Med Name: GABAPENTIN 300MG CAPSULES] 30 capsule 11     Sig: TAKE 1 CAPSULE(300 MG) BY MOUTH AT BEDTIME       Gabapentin/Levetiracetam/Tiagabine Refill Protocol  Passed - 12/21/2020  9:13 AM        Passed - PCP or prescribing provider visit in past 12 months or next 3 months     Last office visit with prescriber/PCP: 2/11/2020 Cierra Stratton MD OR same dept: 11/5/2020 Robyn Iqbal MD OR same specialty: 11/5/2020 Robyn Iqbal MD  Last physical: Visit date not found Last MTM visit: Visit date not found   Next visit within 3 mo: Visit date not found  Next physical within 3 mo: Visit date not found  Prescriber OR PCP: Cierra Stratton MD  Last diagnosis associated with med order: 1. Right cervical radiculopathy  - gabapentin (NEURONTIN) 300 MG capsule [Pharmacy Med Name: GABAPENTIN 300MG CAPSULES]; TAKE 1 CAPSULE(300 MG) BY MOUTH AT BEDTIME  Dispense: 30 capsule; Refill: 11    2. Benign essential hypertension  - losartan (COZAAR) 50 MG tablet [Pharmacy Med Name: LOSARTAN 50MG TABLETS]; TAKE 1 TABLET(50 MG) BY MOUTH DAILY  Dispense: 30 tablet; Refill: 11    If protocol passes may refill for 12 months if within 3 months of last provider visit (or a total of 15 months).                losartan (COZAAR) 50 MG tablet [Pharmacy Med Name: LOSARTAN 50MG TABLETS] 30 tablet 11     Sig: TAKE 1 TABLET(50 MG) BY MOUTH DAILY       Angiotensin Receptor Blocker Protocol Passed - 12/21/2020  9:13 AM        Passed - PCP or prescribing provider visit in past 12 months       Last office visit with prescriber/PCP: 2/11/2020 Cierra Stratton MD OR same dept: 11/5/2020 Robyn Iqbal MD OR same specialty: 11/5/2020 Robyn Iqbal MD  Last  physical: Visit date not found Last MTM visit: Visit date not found   Next visit within 3 mo: Visit date not found  Next physical within 3 mo: Visit date not found  Prescriber OR PCP: Cierra Stratton MD  Last diagnosis associated with med order: 1. Right cervical radiculopathy  - gabapentin (NEURONTIN) 300 MG capsule [Pharmacy Med Name: GABAPENTIN 300MG CAPSULES]; TAKE 1 CAPSULE(300 MG) BY MOUTH AT BEDTIME  Dispense: 30 capsule; Refill: 11    2. Benign essential hypertension  - losartan (COZAAR) 50 MG tablet [Pharmacy Med Name: LOSARTAN 50MG TABLETS]; TAKE 1 TABLET(50 MG) BY MOUTH DAILY  Dispense: 30 tablet; Refill: 11    If protocol passes may refill for 12 months if within 3 months of last provider visit (or a total of 15 months).             Passed - Serum potassium within the past 12 months     Lab Results   Component Value Date    Potassium 3.9 11/05/2020             Passed - Blood pressure filed in past 12 months     BP Readings from Last 1 Encounters:   11/05/20 (!) 140/96             Passed - Serum creatinine within the past 12 months     Creatinine   Date Value Ref Range Status   11/05/2020 0.66 0.60 - 1.10 mg/dL Final

## 2021-06-16 PROBLEM — J30.89 NON-SEASONAL ALLERGIC RHINITIS: Status: ACTIVE | Noted: 2019-12-10

## 2021-06-16 PROBLEM — I10 BENIGN ESSENTIAL HYPERTENSION: Status: ACTIVE | Noted: 2019-12-10

## 2021-09-19 ENCOUNTER — HEALTH MAINTENANCE LETTER (OUTPATIENT)
Age: 56
End: 2021-09-19

## 2022-01-25 ENCOUNTER — IMMUNIZATION (OUTPATIENT)
Dept: NURSING | Facility: CLINIC | Age: 57
End: 2022-01-25
Payer: COMMERCIAL

## 2022-01-25 PROCEDURE — 0064A COVID-19,PF,MODERNA (18+ YRS BOOSTER .25ML): CPT

## 2022-01-25 PROCEDURE — 91306 COVID-19,PF,MODERNA (18+ YRS BOOSTER .25ML): CPT

## 2022-02-02 ENCOUNTER — OFFICE VISIT (OUTPATIENT)
Dept: FAMILY MEDICINE | Facility: CLINIC | Age: 57
End: 2022-02-02
Payer: COMMERCIAL

## 2022-02-02 VITALS
SYSTOLIC BLOOD PRESSURE: 130 MMHG | OXYGEN SATURATION: 99 % | HEART RATE: 70 BPM | HEIGHT: 58 IN | RESPIRATION RATE: 20 BRPM | DIASTOLIC BLOOD PRESSURE: 82 MMHG | TEMPERATURE: 98.3 F | WEIGHT: 138.5 LBS | BODY MASS INDEX: 29.07 KG/M2

## 2022-02-02 DIAGNOSIS — J30.89 NON-SEASONAL ALLERGIC RHINITIS, UNSPECIFIED TRIGGER: ICD-10-CM

## 2022-02-02 DIAGNOSIS — Z11.4 SCREENING FOR HIV (HUMAN IMMUNODEFICIENCY VIRUS): ICD-10-CM

## 2022-02-02 DIAGNOSIS — M54.2 NECK PAIN ON RIGHT SIDE: Primary | ICD-10-CM

## 2022-02-02 DIAGNOSIS — I10 BENIGN ESSENTIAL HYPERTENSION: ICD-10-CM

## 2022-02-02 DIAGNOSIS — Z13.220 SCREENING FOR HYPERLIPIDEMIA: ICD-10-CM

## 2022-02-02 DIAGNOSIS — Z12.31 VISIT FOR SCREENING MAMMOGRAM: ICD-10-CM

## 2022-02-02 DIAGNOSIS — Z11.59 NEED FOR HEPATITIS C SCREENING TEST: ICD-10-CM

## 2022-02-02 DIAGNOSIS — Z23 ENCOUNTER FOR IMMUNIZATION: ICD-10-CM

## 2022-02-02 DIAGNOSIS — R10.13 EPIGASTRIC PAIN: ICD-10-CM

## 2022-02-02 LAB
ANION GAP SERPL CALCULATED.3IONS-SCNC: 8 MMOL/L (ref 5–18)
BUN SERPL-MCNC: 15 MG/DL (ref 8–22)
CALCIUM SERPL-MCNC: 9.8 MG/DL (ref 8.5–10.5)
CHLORIDE BLD-SCNC: 103 MMOL/L (ref 98–107)
CHOLEST SERPL-MCNC: 198 MG/DL
CO2 SERPL-SCNC: 29 MMOL/L (ref 22–31)
CREAT SERPL-MCNC: 0.67 MG/DL (ref 0.6–1.1)
ERYTHROCYTE [DISTWIDTH] IN BLOOD BY AUTOMATED COUNT: 12.9 % (ref 10–15)
FASTING STATUS PATIENT QL REPORTED: NO
GFR SERPL CREATININE-BSD FRML MDRD: >90 ML/MIN/1.73M2
GLUCOSE BLD-MCNC: 91 MG/DL (ref 70–125)
HCT VFR BLD AUTO: 42.8 % (ref 35–47)
HDLC SERPL-MCNC: 41 MG/DL
HGB BLD-MCNC: 13.5 G/DL (ref 11.7–15.7)
HIV 1+2 AB+HIV1 P24 AG SERPL QL IA: NEGATIVE
LDLC SERPL CALC-MCNC: 123 MG/DL
MCH RBC QN AUTO: 27.3 PG (ref 26.5–33)
MCHC RBC AUTO-ENTMCNC: 31.5 G/DL (ref 31.5–36.5)
MCV RBC AUTO: 87 FL (ref 78–100)
PLATELET # BLD AUTO: 300 10E3/UL (ref 150–450)
POTASSIUM BLD-SCNC: 4.1 MMOL/L (ref 3.5–5)
RBC # BLD AUTO: 4.95 10E6/UL (ref 3.8–5.2)
SODIUM SERPL-SCNC: 140 MMOL/L (ref 136–145)
TRIGL SERPL-MCNC: 172 MG/DL
WBC # BLD AUTO: 4.8 10E3/UL (ref 4–11)

## 2022-02-02 PROCEDURE — 80048 BASIC METABOLIC PNL TOTAL CA: CPT | Performed by: FAMILY MEDICINE

## 2022-02-02 PROCEDURE — 90682 RIV4 VACC RECOMBINANT DNA IM: CPT | Performed by: FAMILY MEDICINE

## 2022-02-02 PROCEDURE — 36415 COLL VENOUS BLD VENIPUNCTURE: CPT | Performed by: FAMILY MEDICINE

## 2022-02-02 PROCEDURE — 85027 COMPLETE CBC AUTOMATED: CPT | Performed by: FAMILY MEDICINE

## 2022-02-02 PROCEDURE — 86803 HEPATITIS C AB TEST: CPT | Performed by: FAMILY MEDICINE

## 2022-02-02 PROCEDURE — 87389 HIV-1 AG W/HIV-1&-2 AB AG IA: CPT | Performed by: FAMILY MEDICINE

## 2022-02-02 PROCEDURE — 80061 LIPID PANEL: CPT | Performed by: FAMILY MEDICINE

## 2022-02-02 PROCEDURE — 99214 OFFICE O/P EST MOD 30 MIN: CPT | Mod: 25 | Performed by: FAMILY MEDICINE

## 2022-02-02 RX ORDER — LOSARTAN POTASSIUM 50 MG/1
TABLET ORAL
Qty: 30 TABLET | Refills: 11 | Status: SHIPPED | OUTPATIENT
Start: 2022-02-02 | End: 2022-11-29

## 2022-02-02 RX ORDER — ACETAMINOPHEN 500 MG
TABLET ORAL
Qty: 100 TABLET | Refills: 5 | Status: SHIPPED | OUTPATIENT
Start: 2022-02-02 | End: 2022-04-20

## 2022-02-02 RX ORDER — CETIRIZINE HYDROCHLORIDE 10 MG/1
10 TABLET ORAL DAILY
Qty: 90 TABLET | Refills: 3 | Status: SHIPPED | OUTPATIENT
Start: 2022-02-02 | End: 2022-04-20

## 2022-02-02 RX ORDER — FAMOTIDINE 20 MG/1
20 TABLET, FILM COATED ORAL 2 TIMES DAILY PRN
Qty: 60 TABLET | Refills: 3 | Status: SHIPPED | OUTPATIENT
Start: 2022-02-02 | End: 2022-04-20

## 2022-02-02 ASSESSMENT — MIFFLIN-ST. JEOR: SCORE: 1100.98

## 2022-02-02 NOTE — PATIENT INSTRUCTIONS
Ice to area of pain on neck for 15 minutes three times a day.    NECK EXERCISES    Exercises: Neck Isometrics  To start, sit in a chair with your feet flat on the floor. Your weight should be slightly forward so that you re balanced evenly on your buttocks. Relax your shoulders and keep your head level. Using a chair with arms may help you keep your balance.  1. Press your palm against your forehead. Resist with your neck muscles. Hold for 10 seconds. Relax. Repeat 5 times.  2. Do the exercise again, pressing on the side of your head. Repeat 5 times. Switch sides.  3. Do the exercise again, pressing on the back of your head. Repeat 5 times.

## 2022-02-02 NOTE — PROGRESS NOTES
"OUTPATIENT VISIT NOTE                                                   Date of Visit: 2/2/2022     Chief Complaint   Patient presents with:  Refill Request            History of Present Illness   Se Menendez is a 56 year old female with  by phone requesting medication refills  And wants to discuss her neck pain.  Has had neck pain over the last year.  Starts around the jaw and radiates to her neck--nagging pain.  Pain is constant.    Uses tylenol that help.  Has not been evaluated for it before.  No pain with chewing.    No chest pain, shortness of breath, leg swelling.    Stomach pain improved.  Uses famotidine as needed           MEDICATIONS   Current Outpatient Medications   Medication     acetaminophen (TYLENOL) 500 MG tablet     cetirizine (ZYRTEC) 10 MG tablet     famotidine (PEPCID) 20 MG tablet     ketorolac (ACULAR) 0.5 % ophthalmic solution     losartan (COZAAR) 50 MG tablet     No current facility-administered medications for this visit.         SOCIAL HISTORY   Social History     Tobacco Use     Smoking status: Never Smoker     Smokeless tobacco: Never Used   Substance Use Topics     Alcohol use: Not on file           Physical Exam   Vitals:    02/02/22 1102   BP: 130/82   BP Location: Left arm   Patient Position: Sitting   Cuff Size: Adult Regular   Pulse: 70   Resp: 20   Temp: 98.3  F (36.8  C)   TempSrc: Oral   SpO2: 99%   Weight: 62.8 kg (138 lb 8 oz)   Height: 1.47 m (4' 9.87\")        GENERAL:   Alert. Oriented.  EYES: Clear  HENT:  Ears: R TM pearly gray. Normal landmarks. L TM pearly gray.  Normal landmarks  Nose: Clear.  Sinuses: Nontender.  Oropharynx:  No erythema. No exudate. Opens fully  NECK: Supple. No adenopathy. Mild tenderness to palpation just below angle of right jaw. FULL Range of motion. Paracervical muscles without tenderness  TMJ:  No tenderness  LUNGS: Clear to ascultation.  No crackles.  No wheezing  HEART: RRR  SKIN:  No rash.            Assessment and Plan     Neck pain " on right side  No swollen lymph nodes. No masses.  No symptoms of cervical radiopathy.  Advised frequent icing.  Neck exercises.  Tylenol as needed.  Will recheck in 3 months if not improved    Benign essential hypertension  Controlled.  Continue losartan.  Check labs  - losartan (COZAAR) 50 MG tablet  Dispense: 30 tablet; Refill: 11  - Basic metabolic panel  - CBC with platelets  - Basic metabolic panel  - CBC with platelets    Non-seasonal allergic rhinitis, unspecified trigger  Refilled zyrtec as requested.  Symptoms stable  - cetirizine (ZYRTEC) 10 MG tablet  Dispense: 90 tablet; Refill: 3    Epigastric pain  Intermittent.  pepcid prn  - famotidine (PEPCID) 20 MG tablet  Dispense: 60 tablet; Refill: 3    Encounter for immunization  given  - TDAP VACCINE (Adacel, Boostrix)  [[id number]]  - INFLUENZA QUAD, RECOMBINANT, P-FREE (RIV4) (FLUBLOK) [[id number]]    Screening for HIV (human immunodeficiency virus)  Screening as recommended  - HIV Antigen Antibody Combo  - HIV Antigen Antibody Combo    Need for hepatitis C screening test  Screening as recommended  - Hepatitis C Screen Reflex to HCV RNA Quant and Genotype  - Hepatitis C Screen Reflex to HCV RNA Quant and Genotype    Screening for hyperlipidemia  Screening as recommended and with hypertension  - Lipid panel reflex to direct LDL Non-fasting  - Lipid panel reflex to direct LDL Non-fasting    Visit for screening mammogram  ordered  - MA SCREENING DIGITAL BILAT - Future  (s+30)             Follow up in three months for recheck and cervical cancer screening.      Discussed signs / symptoms that warrant urgent / emergent medical attention.     Recheck if worsening or not improving.       Gunnar Bates MD          Pertinent History     The following portions of the patient's history were reviewed and updated as appropriate: allergies, current medications, past family history, past medical history, past social history, past surgical history and problem list.

## 2022-02-03 LAB — HCV AB SERPL QL IA: NONREACTIVE

## 2022-04-19 NOTE — PROGRESS NOTES
SUBJECTIVE:   CC: Se Menendez is an 56 year old woman who presents for preventive health visit.       Patient has been advised of split billing requirements and indicates understanding: Yes  Healthy Habits:     Getting at least 3 servings of Calcium per day:  NO    Bi-annual eye exam:  Yes    Dental care twice a year:  Yes    Sleep apnea or symptoms of sleep apnea:  None    Diet:  Regular (no restrictions)    Frequency of exercise:  2-3 days/week    Duration of exercise:  15-30 minutes    Taking medications regularly:  Yes    Medication side effects:  Not applicable    PHQ-2 Total Score: 0    Additional concerns today:  Yes              Today's PHQ-2 Score:   PHQ-2 ( 1999 Pfizer) 4/19/2022   Q1: Little interest or pleasure in doing things 0   Q2: Feeling down, depressed or hopeless 0   PHQ-2 Score 0   Q1: Little interest or pleasure in doing things Not at all   Q2: Feeling down, depressed or hopeless Not at all   PHQ-2 Score 0       Abuse: Current or Past (Physical, Sexual or Emotional) - No  Do you feel safe in your environment? Yes    Have you ever done Advance Care Planning? (For example, a Health Directive, POLST, or a discussion with a medical provider or your loved ones about your wishes): No, advance care planning information given to patient to review.  Patient plans to discuss their wishes with loved ones or provider.      Social History     Tobacco Use     Smoking status: Never Smoker     Smokeless tobacco: Never Used   Substance Use Topics     Alcohol use: Not on file     If you drink alcohol do you typically have >3 drinks per day or >7 drinks per week? Not applicable    No flowsheet data found.    Reviewed orders with patient.  Reviewed health maintenance and updated orders accordingly - Yes      Breast Cancer Screening:    Breast CA Risk Assessment (FHS-7) 4/19/2022 4/20/2022   Do you have a family history of breast, colon, or ovarian cancer? No / Unknown No / Unknown         Mammogram Screening:  "Recommended mammography every 1-2 years with patient discussion and risk factor consideration  Pertinent mammograms are reviewed under the imaging tab.    History of abnormal Pap smear: NO - age 30-65 PAP every 5 years with negative HPV co-testing recommended     Reviewed and updated as needed this visit by clinical staff   Tobacco  Allergies  Meds                Reviewed and updated as needed this visit by Provider                   Patient Active Problem List   Diagnosis     Benign essential hypertension     Non-seasonal allergic rhinitis      No past medical history on file.   Past Surgical History:   Procedure Laterality Date     LAPAROSCOPIC HYSTERECTOMY SUPRACERVICAL  02/25/2010    DAVINCI SUPRACERVICAL HYSTERCTOMY, SACRAL COLPOPEXY WITH MESH, ANTERIOR/POSTERIOR VAGINAL REPAIR/PERINEALPLASTY/CYSTOSCOPY.     OB History   No obstetric history on file.       Review of Systems       OBJECTIVE:   /78   Pulse 73   Temp 98  F (36.7  C) (Oral)   Resp 16   Ht 1.47 m (4' 9.87\")   Wt 64 kg (141 lb)   SpO2 96%   BMI 29.60 kg/m    Physical Exam  General Appearance: Alert, cooperative, no distress, appears stated age  Head: Normocephalic, without obvious abnormality, atraumatic  Eyes: PERRL, conjunctiva/corneas clear, EOM's intact  Ears: Normal TM's and external ear canals, both ears  Throat: Lips, mucosa, and tongue normal; teeth and gums normal  Neck: Supple, symmetrical, trachea midline, no adenopathy;  thyroid: not enlarged, symmetric, no tenderness/mass/nodules  Back: Symmetric, no curvature, ROM normal, no CVA tenderness  Lungs: Clear to auscultation bilaterally, respirations unlabored  Breasts: Declined   Heart: Regular rate and rhythm, S1 and S2 normal, no murmur, rub, or gallop,   Abdomen: Soft, non-tender, no masses, no organomegaly  Pelvic:  Declined  Extremities: Extremities normal, atraumatic, no cyanosis or edema  Skin: Skin color, texture, turgor normal, no rashes or lesions  Lymph nodes: " "Cervical and axillary nodes normal  Neurologic: Normal patellar DTR's, normal gait         ASSESSMENT/PLAN:     Routine general medical examination at a health care facility  - acetaminophen (TYLENOL) 500 MG tablet  Dispense: 100 tablet; Refill: 5    Benign essential hypertension  Adequate control on current regimen.  Had labs recently    Non-seasonal allergic rhinitis, unspecified trigger  Med refilled  - cetirizine (ZYRTEC) 10 MG tablet  Dispense: 90 tablet; Refill: 3    Epigastric pain  Med refilled  - famotidine (PEPCID) 20 MG tablet  Dispense: 60 tablet; Refill: 11    Encounter for screening mammogram for breast cancer  mammo done today    Pap smear of cervix declined  Extensively discussed recommendation for Pap today. She declines.     Need for vaccination  -     ZOSTER VACCINE RECOMBINANT (Shingrix)  -     TDAP VACCINE (Adacel, Boostrix)  [[id number]]     Return in about 6 months (around 10/20/2022) for blood pressure follow-up and Shingles vaccine #2.     COUNSELING:  Reviewed preventive health counseling, as reflected in patient instructions    Estimated body mass index is 29.6 kg/m  as calculated from the following:    Height as of this encounter: 1.47 m (4' 9.87\").    Weight as of this encounter: 64 kg (141 lb).    Weight management plan: Discussed healthy diet and exercise guidelines    She reports that she has never smoked. She has never used smokeless tobacco.      Counseling Resources:  ATP IV Guidelines  Pooled Cohorts Equation Calculator  Breast Cancer Risk Calculator  BRCA-Related Cancer Risk Assessment: FHS-7 Tool  FRAX Risk Assessment  ICSI Preventive Guidelines  Dietary Guidelines for Americans, 2010  USDA's MyPlate  ASA Prophylaxis  Lung CA Screening    Cierra Stratton MD  [facility]  "

## 2022-04-20 ENCOUNTER — OFFICE VISIT (OUTPATIENT)
Dept: FAMILY MEDICINE | Facility: CLINIC | Age: 57
End: 2022-04-20
Payer: COMMERCIAL

## 2022-04-20 ENCOUNTER — ANCILLARY PROCEDURE (OUTPATIENT)
Dept: MAMMOGRAPHY | Facility: CLINIC | Age: 57
End: 2022-04-20
Attending: FAMILY MEDICINE
Payer: COMMERCIAL

## 2022-04-20 VITALS
OXYGEN SATURATION: 96 % | BODY MASS INDEX: 29.6 KG/M2 | TEMPERATURE: 98 F | DIASTOLIC BLOOD PRESSURE: 78 MMHG | WEIGHT: 141 LBS | HEIGHT: 58 IN | HEART RATE: 73 BPM | RESPIRATION RATE: 16 BRPM | SYSTOLIC BLOOD PRESSURE: 134 MMHG

## 2022-04-20 DIAGNOSIS — Z12.31 VISIT FOR SCREENING MAMMOGRAM: ICD-10-CM

## 2022-04-20 DIAGNOSIS — I10 BENIGN ESSENTIAL HYPERTENSION: ICD-10-CM

## 2022-04-20 DIAGNOSIS — Z00.00 ROUTINE GENERAL MEDICAL EXAMINATION AT A HEALTH CARE FACILITY: Primary | ICD-10-CM

## 2022-04-20 DIAGNOSIS — J30.89 NON-SEASONAL ALLERGIC RHINITIS, UNSPECIFIED TRIGGER: ICD-10-CM

## 2022-04-20 DIAGNOSIS — R10.13 EPIGASTRIC PAIN: ICD-10-CM

## 2022-04-20 DIAGNOSIS — Z23 NEED FOR VACCINATION: ICD-10-CM

## 2022-04-20 DIAGNOSIS — Z12.31 ENCOUNTER FOR SCREENING MAMMOGRAM FOR BREAST CANCER: ICD-10-CM

## 2022-04-20 DIAGNOSIS — Z53.20 PAP SMEAR OF CERVIX DECLINED: ICD-10-CM

## 2022-04-20 PROCEDURE — 99396 PREV VISIT EST AGE 40-64: CPT | Mod: 25 | Performed by: FAMILY MEDICINE

## 2022-04-20 PROCEDURE — 90750 HZV VACC RECOMBINANT IM: CPT | Performed by: FAMILY MEDICINE

## 2022-04-20 PROCEDURE — 90715 TDAP VACCINE 7 YRS/> IM: CPT | Performed by: FAMILY MEDICINE

## 2022-04-20 PROCEDURE — 90471 IMMUNIZATION ADMIN: CPT | Performed by: FAMILY MEDICINE

## 2022-04-20 PROCEDURE — 77067 SCR MAMMO BI INCL CAD: CPT | Mod: TC | Performed by: RADIOLOGY

## 2022-04-20 PROCEDURE — 90472 IMMUNIZATION ADMIN EACH ADD: CPT | Performed by: FAMILY MEDICINE

## 2022-04-20 RX ORDER — FAMOTIDINE 20 MG/1
20 TABLET, FILM COATED ORAL 2 TIMES DAILY PRN
Qty: 60 TABLET | Refills: 11 | Status: SHIPPED | OUTPATIENT
Start: 2022-04-20 | End: 2023-04-22

## 2022-04-20 RX ORDER — ACETAMINOPHEN 500 MG
TABLET ORAL
Qty: 100 TABLET | Refills: 5 | Status: SHIPPED | OUTPATIENT
Start: 2022-04-20 | End: 2023-07-18

## 2022-04-20 RX ORDER — CETIRIZINE HYDROCHLORIDE 10 MG/1
10 TABLET ORAL DAILY
Qty: 90 TABLET | Refills: 3 | Status: SHIPPED | OUTPATIENT
Start: 2022-04-20 | End: 2023-04-22

## 2022-10-21 ENCOUNTER — OFFICE VISIT (OUTPATIENT)
Dept: FAMILY MEDICINE | Facility: CLINIC | Age: 57
End: 2022-10-21
Payer: COMMERCIAL

## 2022-10-21 VITALS
HEIGHT: 58 IN | BODY MASS INDEX: 29.86 KG/M2 | SYSTOLIC BLOOD PRESSURE: 124 MMHG | DIASTOLIC BLOOD PRESSURE: 82 MMHG | TEMPERATURE: 98.6 F | RESPIRATION RATE: 20 BRPM | HEART RATE: 88 BPM | WEIGHT: 142.25 LBS

## 2022-10-21 DIAGNOSIS — Z23 NEED FOR VACCINATION: ICD-10-CM

## 2022-10-21 DIAGNOSIS — I10 BENIGN ESSENTIAL HYPERTENSION: Primary | ICD-10-CM

## 2022-10-21 PROCEDURE — 99213 OFFICE O/P EST LOW 20 MIN: CPT | Mod: 25 | Performed by: FAMILY MEDICINE

## 2022-10-21 PROCEDURE — 90471 IMMUNIZATION ADMIN: CPT | Performed by: FAMILY MEDICINE

## 2022-10-21 PROCEDURE — 90750 HZV VACC RECOMBINANT IM: CPT | Performed by: FAMILY MEDICINE

## 2022-10-21 NOTE — PROGRESS NOTES
"  Assessment & Plan     1. Benign essential hypertension  Continue current dose of losartan, as blood pressure is adequately controlled on this dose.  Labs up-to-date.  Follow-up in 6 months.  - losartan (COZAAR) 50 MG tablet; [LOSARTAN (COZAAR) 50 MG TABLET] TAKE 1 TABLET(50 MG) BY MOUTH DAILY  Dispense: 30 tablet; Refill: 11    2. Need for vaccination  Shingles vaccine #2 given today.  - ZOSTER VACCINE RECOMBINANT ADJUVANTED (SHINGRIX)                      Return in about 6 months (around 4/21/2023) for blood pressure follow-up.    Cierra Stratton MD  [facility] NOAH Hirsch Se is a 57 year old, presenting for the following health issues:  Hypertension and Imm/Inj (Shingrix 2)      Imm/Inj    History of Present Illness       Hypertension: She presents for follow up of hypertension.  She does check blood pressure  regularly outside of the clinic. Outpatient blood pressures have not been over 140/90. She follows a low salt diet.         Confirms taking bp meds  Checks BP at home; top number 130-135 (rarely 140).    Review of Systems         Objective    /82   Pulse 88   Temp 98.6  F (37  C) (Oral)   Resp 20   Ht 1.47 m (4' 9.87\")   Wt 64.5 kg (142 lb 4 oz)   BMI 29.86 kg/m    Body mass index is 29.86 kg/m .  Physical Exam   OBJECTIVE:  Gen:  A&A, NAD  CV:  HRRR, no M/R/G  Resp:  CTAB  Ext:  W&D, no edema               "

## 2022-11-21 ENCOUNTER — HEALTH MAINTENANCE LETTER (OUTPATIENT)
Age: 57
End: 2022-11-21

## 2022-11-29 RX ORDER — LOSARTAN POTASSIUM 50 MG/1
TABLET ORAL
Qty: 30 TABLET | Refills: 11 | Status: SHIPPED | OUTPATIENT
Start: 2022-11-29 | End: 2023-04-25

## 2023-04-20 ENCOUNTER — PATIENT OUTREACH (OUTPATIENT)
Dept: CARE COORDINATION | Facility: CLINIC | Age: 58
End: 2023-04-20
Payer: COMMERCIAL

## 2023-04-22 DIAGNOSIS — R10.13 EPIGASTRIC PAIN: ICD-10-CM

## 2023-04-22 DIAGNOSIS — J30.89 NON-SEASONAL ALLERGIC RHINITIS, UNSPECIFIED TRIGGER: ICD-10-CM

## 2023-04-22 RX ORDER — FAMOTIDINE 20 MG/1
TABLET, FILM COATED ORAL
Qty: 60 TABLET | Refills: 5 | Status: SHIPPED | OUTPATIENT
Start: 2023-04-22 | End: 2023-07-18

## 2023-04-22 RX ORDER — CETIRIZINE HYDROCHLORIDE 10 MG/1
TABLET ORAL
Qty: 90 TABLET | Refills: 1 | Status: SHIPPED | OUTPATIENT
Start: 2023-04-22 | End: 2023-10-19

## 2023-04-25 ENCOUNTER — OFFICE VISIT (OUTPATIENT)
Dept: FAMILY MEDICINE | Facility: CLINIC | Age: 58
End: 2023-04-25
Payer: COMMERCIAL

## 2023-04-25 VITALS
BODY MASS INDEX: 30.39 KG/M2 | SYSTOLIC BLOOD PRESSURE: 136 MMHG | HEIGHT: 58 IN | HEART RATE: 74 BPM | OXYGEN SATURATION: 98 % | RESPIRATION RATE: 16 BRPM | DIASTOLIC BLOOD PRESSURE: 80 MMHG | TEMPERATURE: 98.4 F | WEIGHT: 144.75 LBS

## 2023-04-25 DIAGNOSIS — R10.9 ABDOMINAL DISCOMFORT: ICD-10-CM

## 2023-04-25 DIAGNOSIS — H93.12 TINNITUS, LEFT: ICD-10-CM

## 2023-04-25 DIAGNOSIS — I10 BENIGN ESSENTIAL HYPERTENSION: Primary | ICD-10-CM

## 2023-04-25 DIAGNOSIS — Z78.9 HEPATITIS B VACCINATION STATUS UNKNOWN: ICD-10-CM

## 2023-04-25 LAB
ALBUMIN SERPL BCG-MCNC: 4.6 G/DL (ref 3.5–5.2)
ALP SERPL-CCNC: 113 U/L (ref 35–104)
ALT SERPL W P-5'-P-CCNC: 24 U/L (ref 10–35)
ANION GAP SERPL CALCULATED.3IONS-SCNC: 14 MMOL/L (ref 7–15)
AST SERPL W P-5'-P-CCNC: 20 U/L (ref 10–35)
BILIRUB SERPL-MCNC: 0.7 MG/DL
BUN SERPL-MCNC: 18.7 MG/DL (ref 6–20)
CALCIUM SERPL-MCNC: 9.8 MG/DL (ref 8.6–10)
CHLORIDE SERPL-SCNC: 103 MMOL/L (ref 98–107)
CREAT SERPL-MCNC: 0.53 MG/DL (ref 0.51–0.95)
DEPRECATED HCO3 PLAS-SCNC: 26 MMOL/L (ref 22–29)
GFR SERPL CREATININE-BSD FRML MDRD: >90 ML/MIN/1.73M2
GLUCOSE SERPL-MCNC: 99 MG/DL (ref 70–99)
HBV SURFACE AB SERPL IA-ACNC: 132.96 M[IU]/ML
HBV SURFACE AB SERPL IA-ACNC: REACTIVE M[IU]/ML
HBV SURFACE AG SERPL QL IA: NONREACTIVE
POTASSIUM SERPL-SCNC: 4.1 MMOL/L (ref 3.4–5.3)
PROT SERPL-MCNC: 8.2 G/DL (ref 6.4–8.3)
SODIUM SERPL-SCNC: 143 MMOL/L (ref 136–145)

## 2023-04-25 PROCEDURE — 80053 COMPREHEN METABOLIC PANEL: CPT | Performed by: FAMILY MEDICINE

## 2023-04-25 PROCEDURE — 36415 COLL VENOUS BLD VENIPUNCTURE: CPT | Performed by: FAMILY MEDICINE

## 2023-04-25 PROCEDURE — 87340 HEPATITIS B SURFACE AG IA: CPT | Performed by: FAMILY MEDICINE

## 2023-04-25 PROCEDURE — 99214 OFFICE O/P EST MOD 30 MIN: CPT | Performed by: FAMILY MEDICINE

## 2023-04-25 PROCEDURE — 86706 HEP B SURFACE ANTIBODY: CPT | Performed by: FAMILY MEDICINE

## 2023-04-25 RX ORDER — FAMOTIDINE 20 MG/1
20 TABLET, FILM COATED ORAL 2 TIMES DAILY PRN
Qty: 20 TABLET | Refills: 3 | Status: SHIPPED | OUTPATIENT
Start: 2023-04-25 | End: 2024-03-13

## 2023-04-25 RX ORDER — LOSARTAN POTASSIUM 50 MG/1
50 TABLET ORAL 2 TIMES DAILY
Qty: 60 TABLET | Refills: 11 | Status: SHIPPED | OUTPATIENT
Start: 2023-04-25 | End: 2024-03-13

## 2023-04-25 ASSESSMENT — PATIENT HEALTH QUESTIONNAIRE - PHQ9
10. IF YOU CHECKED OFF ANY PROBLEMS, HOW DIFFICULT HAVE THESE PROBLEMS MADE IT FOR YOU TO DO YOUR WORK, TAKE CARE OF THINGS AT HOME, OR GET ALONG WITH OTHER PEOPLE: SOMEWHAT DIFFICULT
SUM OF ALL RESPONSES TO PHQ QUESTIONS 1-9: 9
SUM OF ALL RESPONSES TO PHQ QUESTIONS 1-9: 9

## 2023-04-25 NOTE — PATIENT INSTRUCTIONS
"I think your \"ear ringing\" supplement may be causing your stomach discomfort.  Try stopping it for a week to see if you feel better.  If so, I would avoid this supplement.  It's generally safe, but not likely to help the sound in your ear.    Goal blood pressure is less than 130/80.  I recommend increasing losartan from 50mg daily to 100mg daily.  You can take all at once or split 50mg morning 50mg evening (you choice).  "

## 2023-04-25 NOTE — LETTER
May 18, 2023      Se Menendez  [address] [place] [address]        Dear ,    Your labs from 4/25/23 were basically normal.  You are immune to Hepatitis B, probably due to past vaccination.      Resulted Orders   Comprehensive metabolic panel (BMP + Alb, Alk Phos, ALT, AST, Total. Bili, TP)   Result Value Ref Range    Sodium 143 136 - 145 mmol/L    Potassium 4.1 3.4 - 5.3 mmol/L    Chloride 103 98 - 107 mmol/L    Carbon Dioxide (CO2) 26 22 - 29 mmol/L    Anion Gap 14 7 - 15 mmol/L    Urea Nitrogen 18.7 6.0 - 20.0 mg/dL    Creatinine 0.53 0.51 - 0.95 mg/dL    Calcium 9.8 8.6 - 10.0 mg/dL    Glucose 99 70 - 99 mg/dL    Alkaline Phosphatase 113 35 - 104 U/L    AST 20 10 - 35 U/L    ALT 24 10 - 35 U/L    Protein Total 8.2 6.4 - 8.3 g/dL    Albumin 4.6 3.5 - 5.2 g/dL    Bilirubin Total 0.7 <=1.2 mg/dL    GFR Estimate >90 >60 mL/min/1.73m2      Comment:      eGFR calculated using 2021 CKD-EPI equation.   Hepatitis B Surface Antibody   Result Value Ref Range    Hepatitis B Surface Antibody Instrument Value 132.96 <8.00 m[IU]/mL    Hepatitis B Surface Antibody Reactive       Comment:      Patient is considered to be immune to infection with hepatitis B when the value is greater than or equal to 12.00 mIU/mL.   Hepatitis B surface antigen   Result Value Ref Range    Hepatitis B Surface Antigen Nonreactive Nonreactive       If you have any questions or concerns, please call the clinic at the number listed above.       Sincerely,      Cierra Stratton MD

## 2023-04-25 NOTE — PROGRESS NOTES
Assessment & Plan     Benign essential hypertension  Blood pressure is borderline controlled here at 136/80 and it sounds like the same is true at home, with some readings above 140.  She is currently on losartan 50 mg daily and I recommend trial of 100 mg daily.  She prefers to split the dose with 50 in the morning 50 at night rather than taking once daily, which is fine.  - Comprehensive metabolic panel (BMP + Alb, Alk Phos, ALT, AST, Total. Bili, TP)  - losartan (COZAAR) 50 MG tablet  Dispense: 60 tablet; Refill: 11    Hepatitis B vaccination status unknown  Unknown hepatitis B history.  Will check serology to see if she needs to be vaccinated.  - Hepatitis B Surface Antibody  - Hepatitis B surface antigen    Tinnitus, left  Ongoing for quite a while.  We discussed that the vitamin supplements she is taking its not very likely to be helpful but it is safe.  - Adult ENT  Referral    Abdominal discomfort  At least some of her abdominal discomfort seems to be related to the supplements she is taking for ringing in her ears, as the timeline matches up with when she started it.  However, she also relates some other discomfort which happens whenever she does not eat regularly and I wonder about the possibility of H. pylori or ulcer.  If she does not get all the way better from stopping the dietary supplement and taking famotidine, she will check a stool sample for H. Pylori.  Of note, she asks if she can take Megace to improve her appetite, but we discussed that there is limited use of this medication more extreme extraction circumstances like severe weight loss or cancer and I rather start with the basic work-up and treatments as above first.  - famotidine (PEPCID) 20 MG tablet  Dispense: 20 tablet; Refill: 3  - Helicobacter pylori Antigen Stool                Follow-up Visit   Expected date:  Aug 25, 2023 (Approximate)      Follow Up Appointment Details:     Follow-up with whom?: PCP    Follow-Up for  "what?: Adult Preventive    Any Additional Chronic Condition Management?: Hypertension    How?: In Person                    MD AMANDA Mendoza [facility] NOAH Hirsch Se is a 57 year old, presenting for the following health issues:  Blood Pressure Check and Abdominal Pain        4/25/2023     8:50 AM   Additional Questions   Roomed by Ada JIMÉNEZ     History of Present Illness       Hypertension: She presents for follow up of hypertension.  She does check blood pressure  regularly outside of the clinic. Outside blood pressures have been over 140/90. She follows a low salt diet.      Today's PHQ-9         PHQ-9 Total Score: 9    PHQ-9 Q9 Thoughts of better off dead/self-harm past 2 weeks :   Not at all    How difficult have these problems made it for you to do your work, take care of things at home, or get along with other people: Somewhat difficult       Here for BP check.  Not doing very well -   1.  Stomach hurting x2 weeks.  Feels like needs to use the bathroom, but when goes BM is fine. 4x/day (used to be 1x/day)  2.  Left ear - hearing something loud x4 years.  Used to be just when room was very quite, but now it's gotten much louder.  No vertigo.  No hearing loss. Has not yet seen ENT.  Tried OTC supplement for \"ear ringing\" at recommendation of a friend- contains vit C, B vitamins, melatonin (PM formulation only(  3.  Right side of head hurts.  4.  Checks blood presures at home; generally 130s 140s systolic.  5.  Asks if she can take megestrol acetate for appetite (someone told her good for appetite).  If missed eating regularly, stomach gets upset and then hard to eat.  Taken famotidine sometimes.            Review of Systems         Objective    /80   Pulse 74   Temp 98.4  F (36.9  C) (Oral)   Resp 16   Ht 1.47 m (4' 9.87\")   Wt 65.7 kg (144 lb 12 oz)   SpO2 98%   BMI 30.39 kg/m    Body mass index is 30.39 kg/m .  Physical Exam   Gen:  A&A, NAD  HEENT: Tympanic membranes " pearly gray bilaterally  CV:  HRRR, no M/R/G  Resp:  CTAB  Abd:  S&NT, no mass  Ext:  W&D, no edema

## 2023-06-02 ENCOUNTER — HEALTH MAINTENANCE LETTER (OUTPATIENT)
Age: 58
End: 2023-06-02

## 2023-07-18 ENCOUNTER — OFFICE VISIT (OUTPATIENT)
Dept: FAMILY MEDICINE | Facility: CLINIC | Age: 58
End: 2023-07-18
Payer: COMMERCIAL

## 2023-07-18 VITALS
SYSTOLIC BLOOD PRESSURE: 135 MMHG | OXYGEN SATURATION: 97 % | TEMPERATURE: 99.1 F | HEART RATE: 90 BPM | RESPIRATION RATE: 16 BRPM | HEIGHT: 58 IN | BODY MASS INDEX: 30.28 KG/M2 | WEIGHT: 144.25 LBS | DIASTOLIC BLOOD PRESSURE: 89 MMHG

## 2023-07-18 DIAGNOSIS — R52 PAIN: ICD-10-CM

## 2023-07-18 DIAGNOSIS — Z29.89 NEED FOR MALARIA PROPHYLAXIS: ICD-10-CM

## 2023-07-18 DIAGNOSIS — A09 TRAVELER'S DIARRHEA: ICD-10-CM

## 2023-07-18 DIAGNOSIS — Z71.84 ENCOUNTER FOR COUNSELING FOR TRAVEL: Primary | ICD-10-CM

## 2023-07-18 PROCEDURE — 99402 PREV MED CNSL INDIV APPRX 30: CPT | Performed by: FAMILY MEDICINE

## 2023-07-18 RX ORDER — AZITHROMYCIN 500 MG/1
500 TABLET, FILM COATED ORAL DAILY
Qty: 15 TABLET | Refills: 0 | Status: SHIPPED | OUTPATIENT
Start: 2023-07-18 | End: 2023-09-13

## 2023-07-18 RX ORDER — ACETAMINOPHEN 500 MG
TABLET ORAL
Qty: 100 TABLET | Refills: 5 | Status: SHIPPED | OUTPATIENT
Start: 2023-07-18 | End: 2024-03-13

## 2023-07-18 RX ORDER — ATOVAQUONE AND PROGUANIL HYDROCHLORIDE 250; 100 MG/1; MG/1
1 TABLET, FILM COATED ORAL DAILY
Qty: 34 TABLET | Refills: 0 | Status: SHIPPED | OUTPATIENT
Start: 2023-08-06 | End: 2023-09-09

## 2023-07-18 NOTE — PROGRESS NOTES
OFFICE VISIT    Assessment/Plan:     Patient Instructions:    -Best wishes on your travels.   -Please  the medications and take them as prescribed.  -For the azithromycin, will only use this medication if you have diarrhea or illness.  -Please read the recommendations from the CDC travelers guide that was printed out for you.    Please seek immediate medical attention (go to the emergency room or urgent care) for the following reasons: worsening symptoms, or any concerning changes.    Please return to clinic as needed.      Se was seen today for travel consult  and medication request.  Diagnoses and all orders for this visit:    Encounter for counseling for travel  -     azithromycin (ZITHROMAX) 500 MG tablet; Take 1 tablet (500 mg) by mouth daily For 3 days at onset of diarrhea.  -     atovaquone-proguanil (MALARONE) 250-100 MG tablet; Take 1 tablet by mouth daily for 34 days Start 2 days before travel and continue 7 days after return.    Need for malaria prophylaxis  -     atovaquone-proguanil (MALARONE) 250-100 MG tablet; Take 1 tablet by mouth daily for 34 days Start 2 days before travel and continue 7 days after return.    Traveler's diarrhea  -     azithromycin (ZITHROMAX) 500 MG tablet; Take 1 tablet (500 mg) by mouth daily For 3 days at onset of diarrhea.    Pain  -     acetaminophen (TYLENOL) 500 MG tablet; [ACETAMINOPHEN (TYLENOL) 500 MG TABLET] TAKE 2 TABLETS(1000 MG) BY MOUTH EVERY 4 HOURS AS NEEDED FOR PAIN          The diagnoses, treatment options, risk, benefits, and recommendations were reviewed with patient/guardian.  Questions were answered to patient's/guardian satisfaction.  Red flag signs were reviewed.  Patient/guardian is in agreement with above plan.      Subjective: 58 year old female with history of hypertension on losartan, non-seasonal allergic rhinitis on cetirizine, GERD who presents to clinic for the following complaints:   Patient presents with:  Travel Consult :  8/9/2023-9/2/2023 [place] and [place]   Medication Request      She will be staying in large cities only. She will be travelling all over the country, including staying in [facility] and [facility]. She has gone to [place] before and has done well. This will be the first time that she will travel to [place].     Discussed conservative management and preventative recommendations in regards to that listed on the CDC website for Travelers to [place] and [place].    Requesting refill of acetaminophen. She has enough losartan on hand.     HM due was reviewed with patient/parent.  Recommendations, risk, benefits were reviewed.  Accepted recommendations were ordered.  Otherwise, patient/parent declined.    Health Maintenance Due   Topic Date Due     PAP  Never done     COVID-19 Vaccine (3 - Booster for Huber series) 03/22/2022     YEARLY PREVENTIVE VISIT  04/20/2023     Immunization History   Administered Date(s) Administered     COVID-19 Monovalent Booster 18+ (Moderna) 01/25/2022     COVID-19 Vaccine (Huber) 03/06/2021     Flu, Unspecified 05/15/2015, 09/30/2018, 09/30/2019, 10/04/2020     HepA, Unspecified 02/10/2010, 10/21/2010     Hepatitis B Immunity: Titer 04/25/2023     Influenza Vaccine 50-64 or 18-64 w/egg allergy (Flublok) 02/02/2022     Poliovirus, inactivated (IPV) 05/15/2015     TDAP (Adacel,Boostrix) 02/10/2010, 04/20/2022     Varicella Immunity: Titer/MD Dx 01/03/2019     Zoster recombinant adjuvanted (SHINGRIX) 04/20/2022, 10/21/2022     She reports that she certain that she has completed the MMR vaccine already.     If she gets a vaccine, she is ill for about 3 weeks with fevers and such.       Malaria prophylaxis recommendations: (based on location and strain of malaria)    She has received a medication years ago for this, though is not listed in the chart. Based on descriptions of timing of medication, patient was likely taking malarone. She had done well on this  "medication.      https://wwwnc.cdc.gov/travel/yellowbook//oziizqsbx-mwjhexwqtokuy-tcvcqbhui/yellow-fever-vaccine-and-malaria-prophylaxis-information-by-country#ndispxz745    https://wwwnc.cdc.gov/travel/yellowbook//preparing/yellow-fever-vaccine-malaria-prevention-by-country/[place]#hgwqagw7907    https://wwwnc.cdc.gov/travel/yellowbook//preparing/yellow-fever-vaccine-malaria-prevention-by-country/[place]#wpckbpy5098      The 10 point review of system is negative except as stated in the HPI.    Allergies were reviewed and updated.    Objective:   /89   Pulse 90   Temp 99.1  F (37.3  C) (Oral)   Resp 16   Ht 1.47 m (4' 9.87\")   Wt 65.4 kg (144 lb 4 oz)   SpO2 97%   BMI 30.28 kg/m    General: Active, alert, nontoxic-appearing.  No acute distress.  HEENT: Normocephalic, atraumatic.  Pupils are equal and round.  Sclera is clear.  Normal external ears. Nares patent.  Moist mucous membranes.    Cardiac: Normal skin tone. Capillary refill less than 3 seconds.   Respiratory/chest: Clear to auscultation bilaterally.  No wheezes, rales, rhonchi.  Breathing is not labored.  No accessory muscle usage.  Extremities: Voluntary movements intact.  Integumentary: No concerning rash or skin changes appreciated.    Amount of time spent in chart review, direct patient contact, care coordination, and related activities to patient care on the day of appointment related to travel counselin minutes.       Justin Portillo MD  [facility] [place] [address]  Phone: [phone]  Fax: [phone]    2023  5:46 AM            Current Outpatient Medications   Medication     acetaminophen (TYLENOL) 500 MG tablet     [START ON 2023] atovaquone-proguanil (MALARONE) 250-100 MG tablet     azithromycin (ZITHROMAX) 500 MG tablet     cetirizine (ZYRTEC) 10 MG tablet     famotidine (PEPCID) 20 MG tablet     ketorolac (ACULAR) 0.5 % ophthalmic solution     losartan (COZAAR) 50 MG tablet "     No current facility-administered medications for this visit.       No Known Allergies    Patient Active Problem List    Diagnosis Date Noted     Benign essential hypertension 12/10/2019     Priority: Medium     Non-seasonal allergic rhinitis 12/10/2019     Priority: Medium       Family History   Problem Relation Age of Onset     Diabetes No family hx of      Cerebrovascular Disease No family hx of      Acute Myocardial Infarction No family hx of      Breast Cancer No family hx of      Ovarian Cancer No family hx of        Past Surgical History:   Procedure Laterality Date     LAPAROSCOPIC HYSTERECTOMY SUPRACERVICAL  02/25/2010    DAVINCI SUPRACERVICAL HYSTERCTOMY, SACRAL COLPOPEXY WITH MESH, ANTERIOR/POSTERIOR VAGINAL REPAIR/PERINEALPLASTY/CYSTOSCOPY.        Social History     Socioeconomic History     Marital status:      Spouse name: Not on file     Number of children: Not on file     Years of education: Not on file     Highest education level: Not on file   Occupational History     Not on file   Tobacco Use     Smoking status: Never     Passive exposure: Never     Smokeless tobacco: Never   Vaping Use     Vaping Use: Never used   Substance and Sexual Activity     Alcohol use: Not on file     Drug use: Not on file     Sexual activity: Not on file   Other Topics Concern     Not on file   Social History Narrative     Not on file     Social Determinants of Health     Financial Resource Strain: Not on file   Food Insecurity: Not on file   Transportation Needs: Not on file   Physical Activity: Not on file   Stress: Not on file   Social Connections: Not on file   Intimate Partner Violence: Not on file   Housing Stability: Not on file

## 2023-07-18 NOTE — PATIENT INSTRUCTIONS
-Thank you for choosing the [facility].  -It was a pleasure to see you today.  -Please take a look at the information below for more specific details regarding the treatment plan and recommendations.  -In this after visit summary is a list of your medications and specific instructions.  Please review this carefully as there may be changes made to your medication list.  -If there are any particular questions or concerns, please feel free to reach out to Dr. Portillo.  -If any labs have been completed, we will reach out to you about results.  If the results are normal or not concerning, a letter or MyChart message will be sent to you.  If any follow-up is needed, either Dr. Portillo or the nurse will give you a call.  If you have not heard regarding results after 2 weeks, please reach out to the clinic.    Patient Instructions:    -Best wishes on your travels.   -Please  the medications and take them as prescribed.  -For the azithromycin, will only use this medication if you have diarrhea or illness.  -Please read the recommendations from the CDC travelers guide that was printed out for you.    Please seek immediate medical attention (go to the emergency room or urgent care) for the following reasons: worsening symptoms, or any concerning changes.    Please return to clinic as needed.      --------------------------------------------------------------------------------------------------------------------    -We are always looking for ways to improve.  You may be selected to receive a survey regarding your visit today.  We encourage you to complete the survey and provide specific, constructive feedback to help us improve our processes.  Thank you for your time!  -Please review the contact information listed on the after visit summary and in the electronic chart.  Below is the phone number that we have on file.  If there are any changes that are needed to be made, please reach out to the  [facility].  [phone] (home)

## 2023-09-06 ENCOUNTER — TELEPHONE (OUTPATIENT)
Dept: FAMILY MEDICINE | Facility: CLINIC | Age: 58
End: 2023-09-06
Payer: COMMERCIAL

## 2023-09-06 NOTE — TELEPHONE ENCOUNTER
Patient Quality Outreach    Patient is due for the following:   Cervical Cancer Screening - PAP Needed  Physical Preventive Adult Physical    Next Steps:   Patient has upcoming appointment, these items will be addressed at that time.    Type of outreach:    Chart review performed, no outreach needed.      Questions for provider review:    None           Ibrahima Sy  Chart routed to Care Team.

## 2023-09-13 ENCOUNTER — OFFICE VISIT (OUTPATIENT)
Dept: FAMILY MEDICINE | Facility: CLINIC | Age: 58
End: 2023-09-13
Attending: FAMILY MEDICINE
Payer: COMMERCIAL

## 2023-09-13 VITALS
WEIGHT: 142.5 LBS | BODY MASS INDEX: 29.91 KG/M2 | DIASTOLIC BLOOD PRESSURE: 102 MMHG | RESPIRATION RATE: 16 BRPM | TEMPERATURE: 98.4 F | OXYGEN SATURATION: 99 % | HEIGHT: 58 IN | HEART RATE: 80 BPM | SYSTOLIC BLOOD PRESSURE: 157 MMHG

## 2023-09-13 DIAGNOSIS — Z12.4 CERVICAL CANCER SCREENING: ICD-10-CM

## 2023-09-13 DIAGNOSIS — Z00.00 ROUTINE GENERAL MEDICAL EXAMINATION AT A HEALTH CARE FACILITY: Primary | ICD-10-CM

## 2023-09-13 DIAGNOSIS — I10 BENIGN ESSENTIAL HYPERTENSION: ICD-10-CM

## 2023-09-13 DIAGNOSIS — Z12.31 ENCOUNTER FOR SCREENING MAMMOGRAM FOR BREAST CANCER: ICD-10-CM

## 2023-09-13 LAB
ALBUMIN SERPL BCG-MCNC: 4.6 G/DL (ref 3.5–5.2)
ALP SERPL-CCNC: 96 U/L (ref 35–104)
ALT SERPL W P-5'-P-CCNC: 31 U/L (ref 0–50)
ANION GAP SERPL CALCULATED.3IONS-SCNC: 11 MMOL/L (ref 7–15)
AST SERPL W P-5'-P-CCNC: 26 U/L (ref 0–45)
BILIRUB DIRECT SERPL-MCNC: <0.2 MG/DL (ref 0–0.3)
BILIRUB SERPL-MCNC: 0.6 MG/DL
BUN SERPL-MCNC: 8.7 MG/DL (ref 8–23)
CALCIUM SERPL-MCNC: 9.8 MG/DL (ref 8.6–10)
CHLORIDE SERPL-SCNC: 102 MMOL/L (ref 98–107)
CHOLEST SERPL-MCNC: 215 MG/DL
CREAT SERPL-MCNC: 0.48 MG/DL (ref 0.51–0.95)
DEPRECATED HCO3 PLAS-SCNC: 27 MMOL/L (ref 22–29)
EGFRCR SERPLBLD CKD-EPI 2021: >90 ML/MIN/1.73M2
GLUCOSE SERPL-MCNC: 91 MG/DL (ref 70–99)
HDLC SERPL-MCNC: 38 MG/DL
LDLC SERPL CALC-MCNC: 126 MG/DL
NONHDLC SERPL-MCNC: 177 MG/DL
POTASSIUM SERPL-SCNC: 4.1 MMOL/L (ref 3.4–5.3)
PROT SERPL-MCNC: 7.9 G/DL (ref 6.4–8.3)
SODIUM SERPL-SCNC: 140 MMOL/L (ref 136–145)
TRIGL SERPL-MCNC: 257 MG/DL

## 2023-09-13 PROCEDURE — 82248 BILIRUBIN DIRECT: CPT | Performed by: FAMILY MEDICINE

## 2023-09-13 PROCEDURE — 87624 HPV HI-RISK TYP POOLED RSLT: CPT | Performed by: FAMILY MEDICINE

## 2023-09-13 PROCEDURE — 99396 PREV VISIT EST AGE 40-64: CPT | Mod: 25 | Performed by: FAMILY MEDICINE

## 2023-09-13 PROCEDURE — G0145 SCR C/V CYTO,THINLAYER,RESCR: HCPCS | Performed by: FAMILY MEDICINE

## 2023-09-13 PROCEDURE — 90682 RIV4 VACC RECOMBINANT DNA IM: CPT | Performed by: FAMILY MEDICINE

## 2023-09-13 PROCEDURE — 80061 LIPID PANEL: CPT | Performed by: FAMILY MEDICINE

## 2023-09-13 PROCEDURE — 99213 OFFICE O/P EST LOW 20 MIN: CPT | Mod: 25 | Performed by: FAMILY MEDICINE

## 2023-09-13 PROCEDURE — 36415 COLL VENOUS BLD VENIPUNCTURE: CPT | Performed by: FAMILY MEDICINE

## 2023-09-13 PROCEDURE — 80053 COMPREHEN METABOLIC PANEL: CPT | Performed by: FAMILY MEDICINE

## 2023-09-13 PROCEDURE — 90471 IMMUNIZATION ADMIN: CPT | Performed by: FAMILY MEDICINE

## 2023-09-13 ASSESSMENT — ENCOUNTER SYMPTOMS
NAUSEA: 0
EYE PAIN: 0
HEARTBURN: 1
FEVER: 0
PARESTHESIAS: 0
HEADACHES: 0
MYALGIAS: 0
WEAKNESS: 0
CONSTIPATION: 0
DIZZINESS: 0
CHILLS: 0
DYSURIA: 0
ARTHRALGIAS: 1
NERVOUS/ANXIOUS: 0
HEMATOCHEZIA: 0
SORE THROAT: 0
FREQUENCY: 0
ABDOMINAL PAIN: 0
PALPITATIONS: 0
HEMATURIA: 0
SHORTNESS OF BREATH: 0
BREAST MASS: 0
JOINT SWELLING: 0
DIARRHEA: 0
COUGH: 0

## 2023-09-13 NOTE — PROGRESS NOTES
SUBJECTIVE:   CC: Se is an 58 year old who presents for preventive health visit.       9/13/2023    10:08 AM   Additional Questions   Roomed by Ibrahima RANKIN       Healthy Habits:     Getting at least 3 servings of Calcium per day:  Yes    Bi-annual eye exam:  Yes    Dental care twice a year:  Yes    Sleep apnea or symptoms of sleep apnea:  Daytime drowsiness    Diet:  Regular (no restrictions)    Frequency of exercise:  None    Taking medications regularly:  Yes    Medication side effects:  None    Additional concerns today:  Yes      Confirms taking losaratan 50mg bid (increased from qday last visit)  BP this morning at home 132  No otc meds, cold meds/etc.  Did take cough medicine last week,                Social History     Tobacco Use    Smoking status: Never     Passive exposure: Never    Smokeless tobacco: Never   Substance Use Topics    Alcohol use: Not on file             9/13/2023    10:17 AM   Alcohol Use   Prescreen: >3 drinks/day or >7 drinks/week? Not Applicable     Reviewed orders with patient.  Reviewed health maintenance and updated orders accordingly - Yes  BP Readings from Last 3 Encounters:   09/13/23 (!) 157/102   07/18/23 135/89   04/25/23 136/80    Wt Readings from Last 3 Encounters:   09/13/23 64.6 kg (142 lb 8 oz)   07/18/23 65.4 kg (144 lb 4 oz)   04/25/23 65.7 kg (144 lb 12 oz)                  Patient Active Problem List   Diagnosis    Benign essential hypertension    Non-seasonal allergic rhinitis     Past Surgical History:   Procedure Laterality Date    LAPAROSCOPIC HYSTERECTOMY SUPRACERVICAL  02/25/2010    DAVINCI SUPRACERVICAL HYSTERCTOMY, SACRAL COLPOPEXY WITH MESH, ANTERIOR/POSTERIOR VAGINAL REPAIR/PERINEALPLASTY/CYSTOSCOPY.       Social History     Tobacco Use    Smoking status: Never     Passive exposure: Never    Smokeless tobacco: Never   Substance Use Topics    Alcohol use: Not on file     Family History   Problem Relation Age of Onset    Diabetes No family hx of      Cerebrovascular Disease No family hx of     Acute Myocardial Infarction No family hx of     Breast Cancer No family hx of     Ovarian Cancer No family hx of          Current Outpatient Medications   Medication Sig Dispense Refill    acetaminophen (TYLENOL) 500 MG tablet [ACETAMINOPHEN (TYLENOL) 500 MG TABLET] TAKE 2 TABLETS(1000 MG) BY MOUTH EVERY 4 HOURS AS NEEDED FOR PAIN 100 tablet 5    cetirizine (ZYRTEC) 10 MG tablet TAKE 1 TABLET(10 MG) BY MOUTH DAILY 90 tablet 1    famotidine (PEPCID) 20 MG tablet Take 1 tablet (20 mg) by mouth 2 times daily as needed (stomach upset) 20 tablet 3    ketorolac (ACULAR) 0.5 % ophthalmic solution [KETOROLAC (ACULAR) 0.5 % OPHTHALMIC SOLUTION] INSTILL 1 DROP OU BID FOR 30 DAYS      losartan (COZAAR) 50 MG tablet Take 1 tablet (50 mg) by mouth 2 times daily 60 tablet 11     No Known Allergies    Breast Cancer Screenin/19/2022    10:16 AM 2022     1:08 PM   Breast CA Risk Assessment (FHS-7)   Do you have a family history of breast, colon, or ovarian cancer? No / Unknown No / Unknown       Mammogram Screening: Recommended mammography every 1-2 years with patient discussion and risk factor consideration  Pertinent mammograms are reviewed under the imaging tab.    History of abnormal Pap smear: unknown.  I can't find any Pap Smear history on her, but would suspect she at least had one done in  around the time of her supracervical hysterectomy (done for uterine prolapse).     Reviewed and updated as needed this visit by clinical staff   Tobacco  Allergies  Meds   Med Hx  Surg Hx  Fam Hx          Reviewed and updated as needed this visit by Provider       Med Hx  Surg Hx  Fam Hx         History reviewed. No pertinent past medical history.   Past Surgical History:   Procedure Laterality Date    LAPAROSCOPIC HYSTERECTOMY SUPRACERVICAL  2010    DAVINCI SUPRACERVICAL HYSTERCTOMY, SACRAL COLPOPEXY WITH MESH, ANTERIOR/POSTERIOR VAGINAL  "REPAIR/PERINEALPLASTY/CYSTOSCOPY.       Review of Systems   Constitutional:  Negative for chills and fever.   HENT:  Positive for congestion. Negative for ear pain, hearing loss and sore throat.    Eyes:  Negative for pain and visual disturbance.   Respiratory:  Negative for cough and shortness of breath.    Cardiovascular:  Negative for chest pain, palpitations and peripheral edema.   Gastrointestinal:  Positive for heartburn. Negative for abdominal pain, constipation, diarrhea, hematochezia and nausea.   Breasts:  Negative for tenderness, breast mass and discharge.   Genitourinary:  Negative for dysuria, frequency, genital sores, hematuria, pelvic pain, urgency, vaginal bleeding and vaginal discharge.   Musculoskeletal:  Positive for arthralgias. Negative for joint swelling and myalgias.   Skin:  Negative for rash.   Neurological:  Negative for dizziness, weakness, headaches and paresthesias.   Psychiatric/Behavioral:  Positive for mood changes. The patient is not nervous/anxious.         OBJECTIVE:   BP (!) 157/102   Pulse 80   Temp 98.4  F (36.9  C) (Oral)   Resp 16   Ht 1.473 m (4' 10\")   Wt 64.6 kg (142 lb 8 oz)   LMP  (LMP Unknown)   SpO2 99%   Breastfeeding No   BMI 29.78 kg/m    Physical Exam  GENERAL: healthy, alert and no distress  EYES: Eyes grossly normal to inspection, PERRL and conjunctivae and sclerae normal  HENT: ear canals and TM's normal, nose and mouth without ulcers or lesions  NECK: no adenopathy, no asymmetry, masses, or scars and thyroid normal to palpation  RESP: lungs clear to auscultation - no rales, rhonchi or wheezes  CV: regular rate and rhythm, normal S1 S2, no S3 or S4, no murmur, click or rub, no peripheral edema and peripheral pulses strong  ABDOMEN: soft, nontender, no hepatosplenomegaly, no masses and bowel sounds normal   (female): normal female external genitalia, normal urethral meatus, vaginal mucosa pink, moist, normal-appearing cervix  MS: no gross " musculoskeletal defects noted, no edema  SKIN: no suspicious lesions or rashes  NEURO: Normal strength and tone, mentation intact and speech normal  PSYCH: mentation appears normal, affect normal/bright        ASSESSMENT/PLAN:    was seen today for physical.    Diagnoses and all orders for this visit:    Routine general medical examination at a health care facility    Cervical cancer screening  We do not have any Pap smear history on file for this patient and she ultimately agreed to Pap smear exam today.  Follow-up depending on results.  -     Pap Screen with HPV - recommended age 30 - 65 years    Encounter for screening mammogram for breast cancer  Last mammogram was about a year and a half ago and she prefers to wait the full 2 years before getting screening mammogram; Future order entered.  -     MA Screening Digital Bilateral; Future    Benign essential hypertension  Blood pressure was elevated today in spite of patient confirming that she is taking losartan 50 mg twice daily as prescribed, recently increased from 50 mg once daily (patient prefers splitting the 100 mg daily instead of taking it altogether).  She does not really think that our readings are accurate as her blood pressures have been in the 130s at home.  I would like her to be sure to stand the losartan and then return in about 2 weeks for nurse only visit to have her blood pressure checked here again.  She should bring her blood pressure machine with her that day to check for accuracy.  If blood pressure continues to be elevated, would add an additional antihypertensive medication.  -     Lipid panel reflex to direct LDL Fasting  -     Basic metabolic panel  (Ca, Cl, CO2, Creat, Gluc, K, Na, BUN)  -     Hepatic panel (Albumin, ALT, AST, Bili, Alk Phos, TP)    Other orders  -     PRIMARY CARE FOLLOW-UP SCHEDULING  -     REVIEW OF HEALTH MAINTENANCE PROTOCOL ORDERS  -     PRIMARY CARE FOLLOW-UP SCHEDULING; Future  -     PRIMARY CARE FOLLOW-UP  "SCHEDULING; Future  -     PRIMARY CARE FOLLOW-UP SCHEDULING; Future  -     INFLUENZA VACCINE 18-64Y (FLUBLOK)               COUNSELING:  Reviewed preventive health counseling, as reflected in patient instructions      BMI:   Estimated body mass index is 29.78 kg/m  as calculated from the following:    Height as of this encounter: 1.473 m (4' 10\").    Weight as of this encounter: 64.6 kg (142 lb 8 oz).   Weight management plan: Discussed healthy diet and exercise guidelines      She reports that she has never smoked. She has never been exposed to tobacco smoke. She has never used smokeless tobacco.            Prior to immunization administration, verified patients identity using patient s name and date of birth. Please see Immunization Activity for additional information.     Screening Questionnaire for Adult Immunization    Are you sick today?   No   Do you have allergies to medications, food, a vaccine component or latex?   No   Have you ever had a serious reaction after receiving a vaccination?   No   Do you have a long-term health problem with heart, lung, kidney, or metabolic disease (e.g., diabetes), asthma, a blood disorder, no spleen, complement component deficiency, a cochlear implant, or a spinal fluid leak?  Are you on long-term aspirin therapy?   No   Do you have cancer, leukemia, HIV/AIDS, or any other immune system problem?   No   Do you have a parent, brother, or sister with an immune system problem?   No   In the past 3 months, have you taken medications that affect  your immune system, such as prednisone, other steroids, or anticancer drugs; drugs for the treatment of rheumatoid arthritis, Crohn s disease, or psoriasis; or have you had radiation treatments?   No   Have you had a seizure, or a brain or other nervous system problem?   No   During the past year, have you received a transfusion of blood or blood    products, or been given immune (gamma) globulin or antiviral drug?   No   For women: Are " you pregnant or is there a chance you could become       pregnant during the next month?   No   Have you received any vaccinations in the past 4 weeks?   No     Immunization questionnaire answers were all negative.  Patient instructed to remain in clinic for 15 minutes afterwards, and to report any adverse reactions.   Screening performed by Ibrahima Sy on 9/13/2023 at 10:58 AM.    Cierra Stratton MD  [facility]

## 2023-09-18 LAB
BKR LAB AP GYN ADEQUACY: NORMAL
BKR LAB AP GYN INTERPRETATION: NORMAL
BKR LAB AP HPV REFLEX: NORMAL
BKR LAB AP PREVIOUS ABNORMAL: NORMAL
PATH REPORT.COMMENTS IMP SPEC: NORMAL
PATH REPORT.COMMENTS IMP SPEC: NORMAL
PATH REPORT.RELEVANT HX SPEC: NORMAL

## 2023-09-20 ENCOUNTER — PATIENT OUTREACH (OUTPATIENT)
Dept: FAMILY MEDICINE | Facility: CLINIC | Age: 58
End: 2023-09-20
Payer: COMMERCIAL

## 2023-09-20 ENCOUNTER — APPOINTMENT (OUTPATIENT)
Dept: INTERPRETER SERVICES | Facility: CLINIC | Age: 58
End: 2023-09-20
Payer: COMMERCIAL

## 2023-09-20 DIAGNOSIS — R87.810 CERVICAL HIGH RISK HPV (HUMAN PAPILLOMAVIRUS) TEST POSITIVE: Primary | ICD-10-CM

## 2023-09-20 LAB
HUMAN PAPILLOMA VIRUS 16 DNA: NEGATIVE
HUMAN PAPILLOMA VIRUS 18 DNA: NEGATIVE
HUMAN PAPILLOMA VIRUS FINAL DIAGNOSIS: ABNORMAL
HUMAN PAPILLOMA VIRUS OTHER HR: POSITIVE

## 2023-09-21 ENCOUNTER — APPOINTMENT (OUTPATIENT)
Dept: INTERPRETER SERVICES | Facility: CLINIC | Age: 58
End: 2023-09-21
Payer: COMMERCIAL

## 2023-09-25 ENCOUNTER — APPOINTMENT (OUTPATIENT)
Dept: INTERPRETER SERVICES | Facility: CLINIC | Age: 58
End: 2023-09-25
Payer: COMMERCIAL

## 2023-09-27 ENCOUNTER — APPOINTMENT (OUTPATIENT)
Dept: INTERPRETER SERVICES | Facility: CLINIC | Age: 58
End: 2023-09-27
Payer: COMMERCIAL

## 2023-09-28 ENCOUNTER — ALLIED HEALTH/NURSE VISIT (OUTPATIENT)
Dept: FAMILY MEDICINE | Facility: CLINIC | Age: 58
End: 2023-09-28
Attending: FAMILY MEDICINE
Payer: COMMERCIAL

## 2023-09-28 VITALS — HEART RATE: 72 BPM | DIASTOLIC BLOOD PRESSURE: 92 MMHG | SYSTOLIC BLOOD PRESSURE: 128 MMHG

## 2023-09-28 DIAGNOSIS — I10 HYPERTENSION, UNSPECIFIED TYPE: Primary | ICD-10-CM

## 2023-09-28 PROCEDURE — 99207 PR NO CHARGE NURSE ONLY: CPT

## 2023-09-28 NOTE — PROGRESS NOTES
I met with Se Menendez at the request of Dr. Stratton to recheck her blood pressure.  Blood pressure medications on the med list were reviewed with patient.    Patient has taken all medications as per usual regimen: Yes  Patient reports tolerating them without any issues or concerns: No    Vitals:    09/28/23 0900 09/28/23 0905   BP: (!) 132/90 (!) 128/92   BP Location: Left arm Left arm   Patient Position: Sitting Sitting   Cuff Size: Adult Regular Adult Regular   Pulse: 72        After 5 minutes, the patient's blood pressure remained greater than or equal to 140/90.    Is the patient currently having any chest pain? No  Does the patient currently have a headache? No  Does the patient currently have any vision changes? No  Does the patient currently have any nausea? No  Does the patient currently have any abdominal pain? No    The previous encounter was reviewed.  The patient was discharged and the note will be sent to the provider for final review.      RN checked patient home BP machine against RN BP check readings. Both readings are consistent. Patient home BP machine was 125/95.         Kath Burgos MSN, RN   [facility]

## 2023-10-19 DIAGNOSIS — J30.89 NON-SEASONAL ALLERGIC RHINITIS, UNSPECIFIED TRIGGER: ICD-10-CM

## 2023-10-19 RX ORDER — CETIRIZINE HYDROCHLORIDE 10 MG/1
TABLET ORAL
Qty: 90 TABLET | Refills: 1 | Status: SHIPPED | OUTPATIENT
Start: 2023-10-19 | End: 2024-03-13

## 2023-10-20 ENCOUNTER — OFFICE VISIT (OUTPATIENT)
Dept: AUDIOLOGY | Facility: CLINIC | Age: 58
End: 2023-10-20
Payer: COMMERCIAL

## 2023-10-20 DIAGNOSIS — H90.5 SENSORINEURAL HEARING LOSS (SNHL), UNSPECIFIED LATERALITY: ICD-10-CM

## 2023-10-20 DIAGNOSIS — H90.A31 MIXED CONDUCTIVE AND SENSORINEURAL HEARING LOSS OF RIGHT EAR WITH RESTRICTED HEARING OF LEFT EAR: ICD-10-CM

## 2023-10-20 DIAGNOSIS — H90.A22 SENSORINEURAL HEARING LOSS (SNHL) OF LEFT EAR WITH RESTRICTED HEARING OF RIGHT EAR: Primary | ICD-10-CM

## 2023-10-20 DIAGNOSIS — H90.5 SENSORINEURAL HEARING LOSS (SNHL), UNSPECIFIED LATERALITY: Primary | ICD-10-CM

## 2023-10-20 PROCEDURE — 92567 TYMPANOMETRY: CPT | Performed by: AUDIOLOGIST

## 2023-10-20 PROCEDURE — 92557 COMPREHENSIVE HEARING TEST: CPT | Performed by: AUDIOLOGIST

## 2023-10-20 NOTE — PROGRESS NOTES
AUDIOLOGY REPORT    SUMMARY: Audiology visit completed. Se Menendez was accompanied by an  at the visit. See audiogram for results.     RECOMMENDATIONS: Follow-up with ENT as scheduled.    Jen Mcfadden, [facility]  [place] Licensed Audiologist #[id number]

## 2023-10-24 ENCOUNTER — OFFICE VISIT (OUTPATIENT)
Dept: OTOLARYNGOLOGY | Facility: CLINIC | Age: 58
End: 2023-10-24
Attending: FAMILY MEDICINE
Payer: COMMERCIAL

## 2023-10-24 DIAGNOSIS — H69.93 NEGATIVE MIDDLE EAR PRESSURE OF BOTH EARS: ICD-10-CM

## 2023-10-24 DIAGNOSIS — H90.3 SNHL (SENSORY-NEURAL HEARING LOSS), ASYMMETRICAL: Primary | ICD-10-CM

## 2023-10-24 DIAGNOSIS — H93.12 TINNITUS, LEFT: ICD-10-CM

## 2023-10-24 PROCEDURE — 99203 OFFICE O/P NEW LOW 30 MIN: CPT | Performed by: OTOLARYNGOLOGY

## 2023-10-24 NOTE — PROGRESS NOTES
CHIEF COMPLAINT: Patient presents with:  Tinnitus: Left sided tinnitus x 5 years and it has been constant the past 2 years. No dizziness. No loud noise exposure. She states she had childhood ear infections. 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     AUDIOLOGY NOTE:     HISTORY:  is accompanied by an . Referred due to tinnitus in the L ear which has been present for about 5 years. Reports the  tinnitus is constant and sounds like a loud pounding drum but is not pulsatile. Reports she experiences dizziness when there is a change in  weather. She describes the dizziness as a headache and vertigo with some nausea and vomiting. Last occurred 2-3 months ago. Denies  recent otalgia, otorrhea, aural fullness, ear surgery, noise exposure, and family history of hearing loss.  RESULTS: Otoscopy: clear canals bilat. Tymps: normal eardrum mobility w/slight negative pressure bilat. 1000 Hz Ipsi Reflexes: Right:  could not seal, Left: absent. Audio: Right: mild rising to normal sloping to moderate mixed hearing loss, Left: normal hearing from 250-6000  hz sloping to moderately-severe likely sensorineural hearing loss. Word Rec: excellent as completed with [employer] . SDTs in  agreement with pure tones.  REC: F/U with ENT as scheduled on 10/24. Retest hearing as required for medical management.      REVIEW OF SYSTEMS    Review of Systems as per HPI and PMHx, otherwise 10 system review system are negative.       ALLERGIES    Patient has no known allergies.    CURRENT MEDICATIONS      Current Outpatient Medications:     acetaminophen (TYLENOL) 500 MG tablet, [ACETAMINOPHEN (TYLENOL) 500 MG TABLET] TAKE 2 TABLETS(1000 MG) BY MOUTH EVERY 4 HOURS AS NEEDED FOR PAIN, Disp: 100 tablet, Rfl: 5    cetirizine (ZYRTEC) 10 MG tablet, TAKE 1 TABLET(10 MG) BY MOUTH DAILY, Disp: 90 tablet, Rfl: 1    famotidine (PEPCID) 20 MG tablet, Take 1 tablet (20 mg) by  mouth 2 times daily as needed (stomach upset), Disp: 20 tablet, Rfl: 3    ketorolac (ACULAR) 0.5 % ophthalmic solution, [KETOROLAC (ACULAR) 0.5 % OPHTHALMIC SOLUTION] INSTILL 1 DROP OU BID FOR 30 DAYS, Disp: , Rfl:     losartan (COZAAR) 50 MG tablet, Take 1 tablet (50 mg) by mouth 2 times daily, Disp: 60 tablet, Rfl: 11     PAST MEDICAL HISTORY    PAST MEDICAL HISTORY: No past medical history on file.    PAST SURGICAL HISTORY    PAST SURGICAL HISTORY:   Past Surgical History:   Procedure Laterality Date    LAPAROSCOPIC HYSTERECTOMY SUPRACERVICAL  02/25/2010    DAVINCI SUPRACERVICAL HYSTERCTOMY, SACRAL COLPOPEXY WITH MESH, ANTERIOR/POSTERIOR VAGINAL REPAIR/PERINEALPLASTY/CYSTOSCOPY.       FAMILY  HISTORY    FAMILY HISTORY:   Family History   Problem Relation Age of Onset    Diabetes No family hx of     Cerebrovascular Disease No family hx of     Acute Myocardial Infarction No family hx of     Breast Cancer No family hx of     Ovarian Cancer No family hx of        SOCIAL HISTORY    SOCIAL HISTORY:   Social History     Tobacco Use    Smoking status: Never     Passive exposure: Never    Smokeless tobacco: Never   Substance Use Topics    Alcohol use: Not on file        PHYSICAL EXAM    HEAD: Normal appearance and symmetry:  No cutaneous lesions.      NECK:  supple     EARS:    Right:   TM intact (slightly retracted)   LEFT:  TM intact (slightly retracted)    EYES:  EOMI    CN VII/XII:  intact     NOSE:     Dorsum:   straight  Septum:  midline  Mucosa:  moist        ORAL CAVITY/OROPHARYNX:     Lips:  Normal.  Tongue: normal, midline  Mucosa:   no lesions     NECK:  Trachea:  midline.              Thyroid:  normal              Adenopathy:  none        NEURO:   Alert and Oriented     GAIT AND STATION:  normal     RESPIRATORY:   Symmetry and Respiratory effort     PSYCH:  Normal mood and affect     SKIN:   warm and dry         IMPRESSION:    Encounter Diagnoses   Name Primary?    Tinnitus, left     SNHL (sensory-neural  hearing loss), asymmetrical Yes          RECOMMENDATIONS:      Discussed tinnitus masking strategies with patient    Return visit 6 months with repeat audiogram.

## 2023-10-24 NOTE — LETTER
10/24/2023         RE: Se Menendez  [address] [place] [address]        Dear Colleague,    Thank you for referring your patient, Se Menendez, to the [facility]. Please see a copy of my visit note below.    CHIEF COMPLAINT: Patient presents with:  Tinnitus: Left sided tinnitus x 5 years and it has been constant the past 2 years. No dizziness. No loud noise exposure. She states she had childhood ear infections. 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     AUDIOLOGY NOTE:     HISTORY:  is accompanied by an . Referred due to tinnitus in the L ear which has been present for about 5 years. Reports the  tinnitus is constant and sounds like a loud pounding drum but is not pulsatile. Reports she experiences dizziness when there is a change in  weather. She describes the dizziness as a headache and vertigo with some nausea and vomiting. Last occurred 2-3 months ago. Denies  recent otalgia, otorrhea, aural fullness, ear surgery, noise exposure, and family history of hearing loss.  RESULTS: Otoscopy: clear canals bilat. Tymps: normal eardrum mobility w/slight negative pressure bilat. 1000 Hz Ipsi Reflexes: Right:  could not seal, Left: absent. Audio: Right: mild rising to normal sloping to moderate mixed hearing loss, Left: normal hearing from 250-6000  hz sloping to moderately-severe likely sensorineural hearing loss. Word Rec: excellent as completed with [employer] . SDTs in  agreement with pure tones.  REC: F/U with ENT as scheduled on 10/24. Retest hearing as required for medical management.      REVIEW OF SYSTEMS    Review of Systems as per HPI and PMHx, otherwise 10 system review system are negative.       ALLERGIES    Patient has no known allergies.    CURRENT MEDICATIONS      Current Outpatient Medications:      acetaminophen (TYLENOL) 500 MG tablet, [ACETAMINOPHEN (TYLENOL) 500 MG TABLET] TAKE 2  TABLETS(1000 MG) BY MOUTH EVERY 4 HOURS AS NEEDED FOR PAIN, Disp: 100 tablet, Rfl: 5     cetirizine (ZYRTEC) 10 MG tablet, TAKE 1 TABLET(10 MG) BY MOUTH DAILY, Disp: 90 tablet, Rfl: 1     famotidine (PEPCID) 20 MG tablet, Take 1 tablet (20 mg) by mouth 2 times daily as needed (stomach upset), Disp: 20 tablet, Rfl: 3     ketorolac (ACULAR) 0.5 % ophthalmic solution, [KETOROLAC (ACULAR) 0.5 % OPHTHALMIC SOLUTION] INSTILL 1 DROP OU BID FOR 30 DAYS, Disp: , Rfl:      losartan (COZAAR) 50 MG tablet, Take 1 tablet (50 mg) by mouth 2 times daily, Disp: 60 tablet, Rfl: 11     PAST MEDICAL HISTORY    PAST MEDICAL HISTORY: No past medical history on file.    PAST SURGICAL HISTORY    PAST SURGICAL HISTORY:   Past Surgical History:   Procedure Laterality Date     LAPAROSCOPIC HYSTERECTOMY SUPRACERVICAL  02/25/2010    DAVINCI SUPRACERVICAL HYSTERCTOMY, SACRAL COLPOPEXY WITH MESH, ANTERIOR/POSTERIOR VAGINAL REPAIR/PERINEALPLASTY/CYSTOSCOPY.       FAMILY  HISTORY    FAMILY HISTORY:   Family History   Problem Relation Age of Onset     Diabetes No family hx of      Cerebrovascular Disease No family hx of      Acute Myocardial Infarction No family hx of      Breast Cancer No family hx of      Ovarian Cancer No family hx of        SOCIAL HISTORY    SOCIAL HISTORY:   Social History     Tobacco Use     Smoking status: Never     Passive exposure: Never     Smokeless tobacco: Never   Substance Use Topics     Alcohol use: Not on file        PHYSICAL EXAM    HEAD: Normal appearance and symmetry:  No cutaneous lesions.      NECK:  supple     EARS:    Right:   TM intact (slightly retracted)   LEFT:  TM intact (slightly retracted)    EYES:  EOMI    CN VII/XII:  intact     NOSE:     Dorsum:   straight  Septum:  midline  Mucosa:  moist        ORAL CAVITY/OROPHARYNX:     Lips:  Normal.  Tongue: normal, midline  Mucosa:   no lesions     NECK:  Trachea:  midline.              Thyroid:  normal              Adenopathy:  none        NEURO:   Alert and  Oriented     GAIT AND STATION:  normal     RESPIRATORY:   Symmetry and Respiratory effort     PSYCH:  Normal mood and affect     SKIN:   warm and dry         IMPRESSION:    Encounter Diagnoses   Name Primary?     Tinnitus, left      SNHL (sensory-neural hearing loss), asymmetrical Yes          RECOMMENDATIONS:      Discussed tinnitus masking strategies with patient    Return visit 6 months with repeat audiogram.      Again, thank you for allowing me to participate in the care of your patient.        Sincerely,        Fadi Rizo MD

## 2023-11-19 ENCOUNTER — APPOINTMENT (OUTPATIENT)
Dept: CT IMAGING | Facility: HOSPITAL | Age: 58
End: 2023-11-19
Attending: EMERGENCY MEDICINE
Payer: COMMERCIAL

## 2023-11-19 ENCOUNTER — HOSPITAL ENCOUNTER (EMERGENCY)
Facility: HOSPITAL | Age: 58
Discharge: HOME | End: 2023-11-19
Attending: EMERGENCY MEDICINE | Admitting: EMERGENCY MEDICINE
Payer: COMMERCIAL

## 2023-11-19 VITALS
HEART RATE: 84 BPM | SYSTOLIC BLOOD PRESSURE: 108 MMHG | TEMPERATURE: 99.1 F | RESPIRATION RATE: 18 BRPM | DIASTOLIC BLOOD PRESSURE: 61 MMHG | OXYGEN SATURATION: 99 %

## 2023-11-19 DIAGNOSIS — R51.9 ACUTE NONINTRACTABLE HEADACHE, UNSPECIFIED HEADACHE TYPE: ICD-10-CM

## 2023-11-19 LAB
ANION GAP SERPL CALCULATED.3IONS-SCNC: 11 MMOL/L (ref 7–15)
BASOPHILS # BLD AUTO: 0.1 10E3/UL (ref 0–0.2)
BASOPHILS NFR BLD AUTO: 1 %
BUN SERPL-MCNC: 13.3 MG/DL (ref 8–23)
CALCIUM SERPL-MCNC: 9.4 MG/DL (ref 8.6–10)
CHLORIDE SERPL-SCNC: 101 MMOL/L (ref 98–107)
CREAT SERPL-MCNC: 0.54 MG/DL (ref 0.51–0.95)
DEPRECATED HCO3 PLAS-SCNC: 26 MMOL/L (ref 22–29)
EGFRCR SERPLBLD CKD-EPI 2021: >90 ML/MIN/1.73M2
EOSINOPHIL # BLD AUTO: 0.1 10E3/UL (ref 0–0.7)
EOSINOPHIL NFR BLD AUTO: 1 %
ERYTHROCYTE [DISTWIDTH] IN BLOOD BY AUTOMATED COUNT: 12.7 % (ref 10–15)
GLUCOSE SERPL-MCNC: 102 MG/DL (ref 70–99)
HCT VFR BLD AUTO: 42.9 % (ref 35–47)
HGB BLD-MCNC: 13.9 G/DL (ref 11.7–15.7)
IMM GRANULOCYTES # BLD: 0 10E3/UL
IMM GRANULOCYTES NFR BLD: 0 %
LYMPHOCYTES # BLD AUTO: 1.9 10E3/UL (ref 0.8–5.3)
LYMPHOCYTES NFR BLD AUTO: 20 %
MCH RBC QN AUTO: 27.4 PG (ref 26.5–33)
MCHC RBC AUTO-ENTMCNC: 32.4 G/DL (ref 31.5–36.5)
MCV RBC AUTO: 84 FL (ref 78–100)
MONOCYTES # BLD AUTO: 0.6 10E3/UL (ref 0–1.3)
MONOCYTES NFR BLD AUTO: 6 %
NEUTROPHILS # BLD AUTO: 6.9 10E3/UL (ref 1.6–8.3)
NEUTROPHILS NFR BLD AUTO: 72 %
NRBC # BLD AUTO: 0 10E3/UL
NRBC BLD AUTO-RTO: 0 /100
PLATELET # BLD AUTO: 304 10E3/UL (ref 150–450)
POTASSIUM SERPL-SCNC: 3.6 MMOL/L (ref 3.4–5.3)
RBC # BLD AUTO: 5.08 10E6/UL (ref 3.8–5.2)
SODIUM SERPL-SCNC: 138 MMOL/L (ref 135–145)
WBC # BLD AUTO: 9.6 10E3/UL (ref 4–11)

## 2023-11-19 PROCEDURE — 250N000011 HC RX IP 250 OP 636: Mod: JZ | Performed by: EMERGENCY MEDICINE

## 2023-11-19 PROCEDURE — 70496 CT ANGIOGRAPHY HEAD: CPT

## 2023-11-19 PROCEDURE — 96375 TX/PRO/DX INJ NEW DRUG ADDON: CPT

## 2023-11-19 PROCEDURE — 82310 ASSAY OF CALCIUM: CPT | Performed by: EMERGENCY MEDICINE

## 2023-11-19 PROCEDURE — 36415 COLL VENOUS BLD VENIPUNCTURE: CPT | Performed by: EMERGENCY MEDICINE

## 2023-11-19 PROCEDURE — 99285 EMERGENCY DEPT VISIT HI MDM: CPT | Mod: 25

## 2023-11-19 PROCEDURE — 70498 CT ANGIOGRAPHY NECK: CPT

## 2023-11-19 PROCEDURE — 96374 THER/PROPH/DIAG INJ IV PUSH: CPT | Mod: 59

## 2023-11-19 PROCEDURE — 85004 AUTOMATED DIFF WBC COUNT: CPT | Performed by: EMERGENCY MEDICINE

## 2023-11-19 RX ORDER — KETOROLAC TROMETHAMINE 15 MG/ML
15 INJECTION, SOLUTION INTRAMUSCULAR; INTRAVENOUS ONCE
Status: COMPLETED | OUTPATIENT
Start: 2023-11-19 | End: 2023-11-19

## 2023-11-19 RX ORDER — IOPAMIDOL 755 MG/ML
75 INJECTION, SOLUTION INTRAVASCULAR ONCE
Status: COMPLETED | OUTPATIENT
Start: 2023-11-19 | End: 2023-11-19

## 2023-11-19 RX ORDER — DIPHENHYDRAMINE HYDROCHLORIDE 50 MG/ML
25 INJECTION INTRAMUSCULAR; INTRAVENOUS ONCE
Status: COMPLETED | OUTPATIENT
Start: 2023-11-19 | End: 2023-11-19

## 2023-11-19 RX ADMIN — IOPAMIDOL 75 ML: 755 INJECTION, SOLUTION INTRAVENOUS at 07:17

## 2023-11-19 RX ADMIN — KETOROLAC TROMETHAMINE 15 MG: 15 INJECTION, SOLUTION INTRAMUSCULAR; INTRAVENOUS at 08:08

## 2023-11-19 RX ADMIN — DIPHENHYDRAMINE HYDROCHLORIDE 25 MG: 50 INJECTION, SOLUTION INTRAMUSCULAR; INTRAVENOUS at 06:24

## 2023-11-19 RX ADMIN — PROCHLORPERAZINE EDISYLATE 10 MG: 5 INJECTION INTRAMUSCULAR; INTRAVENOUS at 06:25

## 2023-11-19 ASSESSMENT — ACTIVITIES OF DAILY LIVING (ADL)
ADLS_ACUITY_SCORE: 35
ADLS_ACUITY_SCORE: 35

## 2023-11-19 NOTE — ED TRIAGE NOTES
Pt c/o headache that started last evening at 1800.  Pt describes the pain more right sided and her vision seems to be little blury from it.  Denies nausea.  Denies vomiting.

## 2023-11-19 NOTE — DISCHARGE INSTRUCTIONS
As we discussed, CT scan is unremarkable.  The 1 thing it does note is that in your thyroid gland there is a nodule.  Next time you see your primary care doctor, they should order an ultrasound to look at this further.  It does not need to be an emergent thing but it does need some follow-up as an outpatient.

## 2023-11-19 NOTE — ED PROVIDER NOTES
EMERGENCY DEPARTMENT ENCOUNTER     NAME: Se Menendez   AGE: 58 year old female   YOB: 1965   MRN: [id number]   EVALUATION DATE & TIME: 11/19/2023  6:01 AM   PCP: Cierra Stratton     Chief Complaint   Patient presents with    Headache   :    FINAL IMPRESSION       1. Acute nonintractable headache, unspecified headache type           ED COURSE & MEDICAL DECISION MAKING      Pertinent Labs & Imaging studies reviewed. (See chart for details)   6:09 AM I met with the patient, obtained history, performed an initial exam, and discussed options and plan for diagnostics and treatment here in the ED.  9:16 AM Checked in on patient. We discussed the plan for discharge and the patient is agreeable. Reviewed supportive cares, symptomatic treatment, outpatient follow up, and reasons to return to the Emergency Department. Patient to be discharged by ED RN.     58 year old female  presents to the Emergency Department for evaluation of right posterior headache.  Patient's  notes that when he massages her right shoulder it causes the headache which radiates up into her neck. Initial Vitals Reviewed. Initial exam notable for generally well-appearing patient with no nuchal rigidity, no fever, normal neurologic exam.  I have a low suspicion that this represents subarachnoid hemorrhage, but I cannot get through the history her telling me that it was for sure gradual in onset.  I am not suspicious of meningitis given the exam.  I suspect based on the description that this is actually a tension headache and initially started treatment with Compazine and Benadryl, ultimately Toradol after negative imaging.  I did decide to get a CTA to rule out subarachnoid hemorrhage with a low pretest probability and fortunately this is negative.  Patient feels significantly improved after the headache cocktail and is comfortable with discharge.           At the conclusion of the encounter I discussed the results of all of the tests and  the disposition. The questions were answered. The patient or family acknowledged understanding and was agreeable with the care plan.     0 minutes critical care time, see procedure note below for details if relevant    Medical Decision Making    History:  Supplemental history from: Documented in chart, if applicable, family member  External Record(s) reviewed: Documented in chart, if applicable., hitory including HTN    Work Up:  Chart documentation includes differential considered and any EKGs or imaging independently interpreted by provider, where specified.  In additional to work up documented, I considered the following work up: Documented in chart, if applicable.    External consultation:  Discussion of management with another provider: Documented in chart, if applicable    Complicating factors:  Care impacted by chronic illness: Hypertension  Care affected by social determinants of health: Access to Medical Care    Disposition considerations: Discharge. No recommendations on prescription strength medication(s). I considered admission, but ultimately discharged patient with reassuring work-up and clinical improvement.        MEDICATIONS GIVEN IN THE EMERGENCY:   Medications   prochlorperazine (COMPAZINE) injection 10 mg (10 mg Intravenous $Given 11/19/23 0625)   diphenhydrAMINE (BENADRYL) injection 25 mg (25 mg Intravenous $Given 11/19/23 0624)   iopamidol (ISOVUE-370) solution 75 mL (75 mLs Intravenous $Given 11/19/23 0717)   ketorolac (TORADOL) injection 15 mg (15 mg Intravenous $Given 11/19/23 0808)      NEW PRESCRIPTIONS STARTED AT TODAY'S ER VISIT   New Prescriptions    No medications on file     ================================================================   HISTORY OF PRESENT ILLNESS       Patient information was obtained from: patient and patient's    Use of Intrepreter: Yes (in person, family)   Se Menendez is a 58 year old female with history of hypertension who presents by walk in for evaluation  of headache.     Around 1800 last evening, the patient started experiencing a right-sided headache. When she woke up this morning, the headache was still there. She describes the pain as feeling like a chicken is biting at her head. She has been taking Tylenol and a migraine medication at home. Denies a history of migraines. No other complaints at this time.      ================================================================        PAST HISTORY     PAST MEDICAL HISTORY:   History reviewed. No pertinent past medical history.   PAST SURGICAL HISTORY:   Past Surgical History:   Procedure Laterality Date    LAPAROSCOPIC HYSTERECTOMY SUPRACERVICAL  02/25/2010    DAVINCI SUPRACERVICAL HYSTERCTOMY, SACRAL COLPOPEXY WITH MESH, ANTERIOR/POSTERIOR VAGINAL REPAIR/PERINEALPLASTY/CYSTOSCOPY.      CURRENT MEDICATIONS:   acetaminophen (TYLENOL) 500 MG tablet  cetirizine (ZYRTEC) 10 MG tablet  famotidine (PEPCID) 20 MG tablet  ketorolac (ACULAR) 0.5 % ophthalmic solution  losartan (COZAAR) 50 MG tablet      ALLERGIES:   No Known Allergies   FAMILY HISTORY:   Family History   Problem Relation Age of Onset    Diabetes No family hx of     Cerebrovascular Disease No family hx of     Acute Myocardial Infarction No family hx of     Breast Cancer No family hx of     Ovarian Cancer No family hx of       SOCIAL HISTORY:   Social History     Socioeconomic History    Marital status:    Tobacco Use    Smoking status: Never     Passive exposure: Never    Smokeless tobacco: Never   Vaping Use    Vaping Use: Never used        VITALS  Patient Vitals for the past 24 hrs:   BP Temp Temp src Pulse Resp SpO2   11/19/23 0730 127/78 -- -- 86 -- 98 %   11/19/23 0645 115/68 -- -- 87 18 93 %   11/19/23 0630 124/73 -- -- 90 18 95 %   11/19/23 0621 133/80 -- -- 87 -- 95 %   11/19/23 0556 (!) 156/96 99.1  F (37.3  C) Temporal 88 16 98 %        ================================================================    PHYSICAL EXAM     VITAL SIGNS: /78    Pulse 86   Temp 99.1  F (37.3  C) (Temporal)   Resp 18   LMP  (LMP Unknown)   SpO2 98%    Constitutional:  Awake, no acute distress   HENT:  Atraumatic, oropharynx without exudate or erythema, membranes moist. No nuchal rigidity.   Lymph:  No adenopathy  Eyes: EOM intact, PERRL, no injection  Neck: Supple  Respiratory:  Clear to auscultation bilaterally, no wheezes or crackles   Cardiovascular:  Regular rate and rhythm, single S1 and S2   GI:  Soft, nontender, nondistended, no rebound or guarding   Musculoskeletal:  Moves all extremities, no lower extremity edema, no deformities    Skin:  Warm, dry  Neurologic:  Alert and oriented x3, no focal deficits noted. Cranial nerves 2-12 intact.      ================================================================  LAB       All pertinent labs reviewed and interpreted.   Labs Ordered and Resulted from Time of ED Arrival to Time of ED Departure   BASIC METABOLIC PANEL - Abnormal       Result Value    Sodium 138      Potassium 3.6      Chloride 101      Carbon Dioxide (CO2) 26      Anion Gap 11      Urea Nitrogen 13.3      Creatinine 0.54      GFR Estimate >90      Calcium 9.4      Glucose 102 (*)    CBC WITH PLATELETS AND DIFFERENTIAL    WBC Count 9.6      RBC Count 5.08      Hemoglobin 13.9      Hematocrit 42.9      MCV 84      MCH 27.4      MCHC 32.4      RDW 12.7      Platelet Count 304      % Neutrophils 72      % Lymphocytes 20      % Monocytes 6      % Eosinophils 1      % Basophils 1      % Immature Granulocytes 0      NRBCs per 100 WBC 0      Absolute Neutrophils 6.9      Absolute Lymphocytes 1.9      Absolute Monocytes 0.6      Absolute Eosinophils 0.1      Absolute Basophils 0.1      Absolute Immature Granulocytes 0.0      Absolute NRBCs 0.0          ===============================================================  RADIOLOGY       Reviewed all pertinent imaging. Please see official radiology report.   CTA Head Neck with Contrast   Final Result   IMPRESSION:     HEAD CT:   1.  No intracranial hemorrhage, mass lesions, hydrocephalus or CT evidence for an acute infarct.   2.  Mild mucosal thickening of the ethmoid air cells and the maxillary sinuses.       HEAD CTA:    1.  No high-grade stenoses or occlusion of the major intracranial arteries. No intracranial aneurysms.      NECK CTA:   1.  Normal neck CTA.   2.  2.4 x 2.5 cm nodule in the left lobe of the thyroid gland. Please correlate with thyroid ultrasound if not previously performed.            ================================================================  EKG         I have independently reviewed and interpreted the EKG(s) documented above.     ================================================================  PROCEDURES         I, Hawa Begum, am serving as a scribe to document services personally performed by Dr. Cohen based on my observation and the provider's statements to me. I, Cora Cohen MD attest that Hawa Begum is acting in a scribe capacity, has observed my performance of the services and has documented them in accordance with my direction.   Cora Cohen M.D.       Emergency Medicine   [facility] EMERGENCY DEPARTMENT  [address] [place] [id number]  [phone]  Dept: [phone]        Cora Cohen MD  11/19/23 0906

## 2024-02-19 ENCOUNTER — APPOINTMENT (OUTPATIENT)
Dept: INTERPRETER SERVICES | Facility: CLINIC | Age: 59
End: 2024-02-19
Payer: COMMERCIAL

## 2024-03-13 ENCOUNTER — OFFICE VISIT (OUTPATIENT)
Dept: FAMILY MEDICINE | Facility: CLINIC | Age: 59
End: 2024-03-13
Attending: FAMILY MEDICINE
Payer: COMMERCIAL

## 2024-03-13 VITALS
OXYGEN SATURATION: 99 % | RESPIRATION RATE: 14 BRPM | BODY MASS INDEX: 30.39 KG/M2 | HEART RATE: 82 BPM | TEMPERATURE: 98.6 F | HEIGHT: 58 IN | WEIGHT: 144.8 LBS | DIASTOLIC BLOOD PRESSURE: 86 MMHG | SYSTOLIC BLOOD PRESSURE: 130 MMHG

## 2024-03-13 DIAGNOSIS — R10.9 ABDOMINAL DISCOMFORT: ICD-10-CM

## 2024-03-13 DIAGNOSIS — J30.89 NON-SEASONAL ALLERGIC RHINITIS, UNSPECIFIED TRIGGER: ICD-10-CM

## 2024-03-13 DIAGNOSIS — R52 PAIN: ICD-10-CM

## 2024-03-13 DIAGNOSIS — I10 BENIGN ESSENTIAL HYPERTENSION: Primary | ICD-10-CM

## 2024-03-13 DIAGNOSIS — R87.810 CERVICAL HIGH RISK HPV (HUMAN PAPILLOMAVIRUS) TEST POSITIVE: ICD-10-CM

## 2024-03-13 PROCEDURE — 99214 OFFICE O/P EST MOD 30 MIN: CPT | Performed by: FAMILY MEDICINE

## 2024-03-13 RX ORDER — ACETAMINOPHEN 500 MG
TABLET ORAL
Qty: 100 TABLET | Refills: 5 | Status: SHIPPED | OUTPATIENT
Start: 2024-03-13

## 2024-03-13 RX ORDER — FAMOTIDINE 20 MG/1
20 TABLET, FILM COATED ORAL 2 TIMES DAILY PRN
Qty: 20 TABLET | Refills: 5 | Status: SHIPPED | OUTPATIENT
Start: 2024-03-13

## 2024-03-13 RX ORDER — CETIRIZINE HYDROCHLORIDE 10 MG/1
10 TABLET ORAL DAILY
Qty: 90 TABLET | Refills: 1 | Status: SHIPPED | OUTPATIENT
Start: 2024-03-13

## 2024-03-13 RX ORDER — LOSARTAN POTASSIUM 100 MG/1
100 TABLET ORAL DAILY
Qty: 90 TABLET | Refills: 4 | Status: SHIPPED | OUTPATIENT
Start: 2024-03-13

## 2024-03-13 NOTE — PROGRESS NOTES
"Assessment & Plan     Diagnoses and all orders for this visit:    Benign essential hypertension  Borderline control on losartan 50mg bid; open to switching to 100mg once per day instead, to reduce missed doses.  -     losartan (COZAAR) 100 MG tablet; Take 1 tablet (100 mg) by mouth daily    Non-seasonal allergic rhinitis, unspecified trigger  -     cetirizine (ZYRTEC) 10 MG tablet; Take 1 tablet (10 mg) by mouth daily    Abdominal discomfort  -     famotidine (PEPCID) 20 MG tablet; Take 1 tablet (20 mg) by mouth 2 times daily as needed (stomach upset)    Cervical high risk HPV (human papillomavirus) test positive  Will be due for repeat Pap smear in September; scheduled for physical with Pap at that time.    Pain  -     acetaminophen (TYLENOL) 500 MG tablet; [ACETAMINOPHEN (TYLENOL) 500 MG TABLET] TAKE 2 TABLETS(1000 MG) BY MOUTH EVERY 4 HOURS AS NEEDED FOR PAIN    Other orders  -     PRIMARY CARE FOLLOW-UP SCHEDULING  -     PRIMARY CARE FOLLOW-UP SCHEDULING; Future                  Subjective   Se is a 58 year old, presenting for the following health issues:  No chief complaint on file.        3/13/2024     9:51 AM   Additional Questions   Roomed by tonny roper     History of Present Illness       Hypertension: She presents for follow up of hypertension.  She does check blood pressure  regularly outside of the clinic. Outside blood pressures have been over 140/90. She does not follow a low salt diet.       Here for bp followup  Takes losartan bid, last dose today.  Forgets med about once per week; does have pill box.                      Objective    /86   Pulse 82   Temp 98.6  F (37  C) (Oral)   Resp 14   Ht 1.474 m (4' 10.03\")   Wt 65.7 kg (144 lb 12.8 oz)   LMP  (LMP Unknown)   SpO2 99%   BMI 30.23 kg/m    Body mass index is 30.23 kg/m .      Physical Exam     Gen:  A&A, NAD  CV:  HRRR, no M/R/G  Resp:  CTAB  Ext:  W&D, no edema           Signed Electronically by: Cierra Stratton MD    "

## 2024-03-21 ENCOUNTER — PATIENT OUTREACH (OUTPATIENT)
Dept: CARE COORDINATION | Facility: CLINIC | Age: 59
End: 2024-03-21
Payer: COMMERCIAL

## 2024-04-18 ENCOUNTER — PATIENT OUTREACH (OUTPATIENT)
Dept: CARE COORDINATION | Facility: CLINIC | Age: 59
End: 2024-04-18
Payer: COMMERCIAL

## 2024-05-20 ENCOUNTER — NURSE TRIAGE (OUTPATIENT)
Dept: FAMILY MEDICINE | Facility: CLINIC | Age: 59
End: 2024-05-20
Payer: COMMERCIAL

## 2024-05-20 NOTE — TELEPHONE ENCOUNTER
"No available appts with PCP for next 2  weeks.  Pt made an appt with PCP clinic provider for Wednesday of this week.  VERNON Ortiz          Reason for Disposition   MODERATE swelling of both ankles (e.g., swelling extends up to the knees) AND new-onset or worsening    Additional Information   Negative: Chest pain   Negative: Followed an insect bite and has localized swelling (e.g., small area of puffy or swollen skin)   Negative: Followed a knee injury   Negative: Ankle or foot injury   Negative: Pregnant with leg swelling or edema   Negative: Difficulty breathing at rest   Negative: Entire foot is cool or blue in comparison to other side   Negative: SEVERE swelling (e.g., swelling extends above knee, entire leg is swollen, weeping fluid)   Negative: Cast on leg or ankle and has increasing pain   Negative: Can't walk or can barely stand (new-onset)   Negative: Fever and red area (or area very tender to touch)   Negative: Patient sounds very sick or weak to the triager   Negative: Swelling of face, arm or hands  (Exception: Slight puffiness of fingers during hot weather.)   Negative: Pregnant 20 or more weeks and sudden weight gain (i.e., > 2 lbs, 1 kg in one week)   Negative: Thigh or calf pain and only 1 side and present > 1 hour   Negative: Thigh, calf, or ankle swelling in only one leg   Negative: Thigh, calf, or ankle swelling in both legs, but one side is definitely more swollen (Exception: Longstanding difference between legs.)    Answer Assessment - Initial Assessment Questions  1. ONSET: \"When did the swelling start?\" (e.g., minutes, hours, days)      About 2 months ago.  2. LOCATION: \"What part of the leg is swollen?\"  \"Are both legs swollen or just one leg?\"      Bilat leg swelling. Redness goes away.  3. SEVERITY: \"How bad is the swelling?\" (e.g., localized; mild, moderate, severe)    - Localized: Small area of swelling localized to one leg.    - MILD pedal edema: Swelling limited to foot and ankle, pitting " "edema < 1/4 inch (6 mm) deep, rest and elevation eliminate most or all swelling.    - MODERATE edema: Swelling of lower leg to knee, pitting edema > 1/4 inch (6 mm) deep, rest and elevation only partially reduce swelling.    - SEVERE edema: Swelling extends above knee, facial or hand swelling present.       Pain mild.  4. REDNESS: \"Does the swelling look red or infected?\"      Redness can be there, but will go away.  5. PAIN: \"Is the swelling painful to touch?\" If Yes, ask: \"How painful is it?\"   (Scale 1-10; mild, moderate or severe)      Mild.  6. FEVER: \"Do you have a fever?\" If Yes, ask: \"What is it, how was it measured, and when did it start?\"       no  7. CAUSE: \"What do you think is causing the leg swelling?\"      Did have bp med changed. Could this be part of the swelling.  8. MEDICAL HISTORY: \"Do you have a history of blood clots (e.g., DVT), cancer, heart failure, kidney disease, or liver failure?\"      HTN  9. RECURRENT SYMPTOM: \"Have you had leg swelling before?\" If Yes, ask: \"When was the last time?\" \"What happened that time?\"  Not sure.  10. OTHER SYMPTOMS: \"Do you have any other symptoms?\" (e.g., chest pain, difficulty breathing)        none  11. PREGNANCY: \"Is there any chance you are pregnant?\" \"When was your last menstrual period?\"        no    Protocols used: Leg Swelling and Edema-A-[place]    "

## 2024-05-22 ENCOUNTER — OFFICE VISIT (OUTPATIENT)
Dept: FAMILY MEDICINE | Facility: CLINIC | Age: 59
End: 2024-05-22
Payer: COMMERCIAL

## 2024-05-22 VITALS
BODY MASS INDEX: 30.44 KG/M2 | SYSTOLIC BLOOD PRESSURE: 134 MMHG | WEIGHT: 145 LBS | OXYGEN SATURATION: 97 % | TEMPERATURE: 97.8 F | HEART RATE: 80 BPM | DIASTOLIC BLOOD PRESSURE: 89 MMHG | RESPIRATION RATE: 20 BRPM | HEIGHT: 58 IN

## 2024-05-22 DIAGNOSIS — M79.89 SWELLING OF BOTH LOWER EXTREMITIES: Primary | ICD-10-CM

## 2024-05-22 DIAGNOSIS — K76.0 HEPATIC STEATOSIS: ICD-10-CM

## 2024-05-22 DIAGNOSIS — I10 BENIGN ESSENTIAL HYPERTENSION: ICD-10-CM

## 2024-05-22 LAB
ALBUMIN SERPL BCG-MCNC: 4.7 G/DL (ref 3.5–5.2)
ALBUMIN UR-MCNC: NEGATIVE MG/DL
ALP SERPL-CCNC: 109 U/L (ref 40–150)
ALT SERPL W P-5'-P-CCNC: 22 U/L (ref 0–50)
ANION GAP SERPL CALCULATED.3IONS-SCNC: 13 MMOL/L (ref 7–15)
APPEARANCE UR: CLEAR
AST SERPL W P-5'-P-CCNC: 21 U/L (ref 0–45)
BACTERIA #/AREA URNS HPF: ABNORMAL /HPF
BASOPHILS # BLD AUTO: 0.1 10E3/UL (ref 0–0.2)
BASOPHILS NFR BLD AUTO: 1 %
BILIRUB DIRECT SERPL-MCNC: <0.2 MG/DL (ref 0–0.3)
BILIRUB SERPL-MCNC: 0.8 MG/DL
BILIRUB UR QL STRIP: NEGATIVE
BUN SERPL-MCNC: 14.5 MG/DL (ref 8–23)
CALCIUM SERPL-MCNC: 9.5 MG/DL (ref 8.6–10)
CHLORIDE SERPL-SCNC: 103 MMOL/L (ref 98–107)
COLOR UR AUTO: YELLOW
CREAT SERPL-MCNC: 0.53 MG/DL (ref 0.51–0.95)
DEPRECATED HCO3 PLAS-SCNC: 24 MMOL/L (ref 22–29)
EGFRCR SERPLBLD CKD-EPI 2021: >90 ML/MIN/1.73M2
EOSINOPHIL # BLD AUTO: 0.1 10E3/UL (ref 0–0.7)
EOSINOPHIL NFR BLD AUTO: 2 %
ERYTHROCYTE [DISTWIDTH] IN BLOOD BY AUTOMATED COUNT: 12.8 % (ref 10–15)
GLUCOSE SERPL-MCNC: 102 MG/DL (ref 70–99)
GLUCOSE UR STRIP-MCNC: NEGATIVE MG/DL
HCT VFR BLD AUTO: 43.4 % (ref 35–47)
HGB BLD-MCNC: 14.2 G/DL (ref 11.7–15.7)
HGB UR QL STRIP: ABNORMAL
IMM GRANULOCYTES # BLD: 0 10E3/UL
IMM GRANULOCYTES NFR BLD: 0 %
KETONES UR STRIP-MCNC: NEGATIVE MG/DL
LEUKOCYTE ESTERASE UR QL STRIP: NEGATIVE
LYMPHOCYTES # BLD AUTO: 2.2 10E3/UL (ref 0.8–5.3)
LYMPHOCYTES NFR BLD AUTO: 44 %
MCH RBC QN AUTO: 28.1 PG (ref 26.5–33)
MCHC RBC AUTO-ENTMCNC: 32.7 G/DL (ref 31.5–36.5)
MCV RBC AUTO: 86 FL (ref 78–100)
MONOCYTES # BLD AUTO: 0.3 10E3/UL (ref 0–1.3)
MONOCYTES NFR BLD AUTO: 6 %
MUCOUS THREADS #/AREA URNS LPF: PRESENT /LPF
NEUTROPHILS # BLD AUTO: 2.3 10E3/UL (ref 1.6–8.3)
NEUTROPHILS NFR BLD AUTO: 46 %
NITRATE UR QL: NEGATIVE
PH UR STRIP: 5.5 [PH] (ref 5–7)
PLATELET # BLD AUTO: 311 10E3/UL (ref 150–450)
POTASSIUM SERPL-SCNC: 3.9 MMOL/L (ref 3.4–5.3)
PROT SERPL-MCNC: 7.9 G/DL (ref 6.4–8.3)
RBC # BLD AUTO: 5.06 10E6/UL (ref 3.8–5.2)
RBC #/AREA URNS AUTO: ABNORMAL /HPF
SODIUM SERPL-SCNC: 140 MMOL/L (ref 135–145)
SP GR UR STRIP: 1.01 (ref 1–1.03)
SQUAMOUS #/AREA URNS AUTO: ABNORMAL /LPF
TSH SERPL DL<=0.005 MIU/L-ACNC: 1.05 UIU/ML (ref 0.3–4.2)
UROBILINOGEN UR STRIP-ACNC: 0.2 E.U./DL
WBC # BLD AUTO: 5 10E3/UL (ref 4–11)
WBC #/AREA URNS AUTO: ABNORMAL /HPF

## 2024-05-22 PROCEDURE — 99213 OFFICE O/P EST LOW 20 MIN: CPT | Performed by: FAMILY MEDICINE

## 2024-05-22 PROCEDURE — 82248 BILIRUBIN DIRECT: CPT | Performed by: FAMILY MEDICINE

## 2024-05-22 PROCEDURE — 81001 URINALYSIS AUTO W/SCOPE: CPT | Performed by: FAMILY MEDICINE

## 2024-05-22 PROCEDURE — 36415 COLL VENOUS BLD VENIPUNCTURE: CPT | Performed by: FAMILY MEDICINE

## 2024-05-22 PROCEDURE — 80053 COMPREHEN METABOLIC PANEL: CPT | Performed by: FAMILY MEDICINE

## 2024-05-22 PROCEDURE — 84443 ASSAY THYROID STIM HORMONE: CPT | Performed by: FAMILY MEDICINE

## 2024-05-22 PROCEDURE — 85025 COMPLETE CBC W/AUTO DIFF WBC: CPT | Performed by: FAMILY MEDICINE

## 2024-05-22 NOTE — PROGRESS NOTES
OFFICE VISIT    Assessment/Plan:     Patient Instructions:    -Wear the compression stockings.   -Follow a low salt diet.   -Try to limit fluid intake to 64 ounces or less each day.   -Further recommendations will depend on lab results.     Please seek immediate medical attention (go to the emergency room or urgent care) for the following reasons: worsening symptoms, chest pain, shortness of breath, dizziness, fainting, or any concerning changes.      Se was seen today for leg swelling.  Diagnoses and all orders for this visit:    Swelling of both lower extremities  Benign essential hypertension  Hepatic steatosis: Labs were overall reassuring.  Etiology unclear.  Patient does not require any medications that could potentially cause edema.  Overall function seems normal as well.  Heart exam is normal.  No respiratory difficulties.  Plan to treat conservatively and with stockings as below.  Patient does endorse eating high amounts of salt on a regular basis.  Reviewed recommendations.  Discussed medication options and patient declines.  -     Basic metabolic panel; Future  -     CBC with Platelets & Differential; Future  -     Hepatic function panel; Future  -     TSH with free T4 reflex; Future  -     UA Macroscopic with reflex to Microscopic and Culture; Future  -     Compression Sleeve/Stocking Order for DME - ONLY FOR DME        Return in about 1 month (around 6/22/2024), or if symptoms worsen or fail to improve.    The diagnoses, treatment options, risk, benefits, and recommendations were reviewed with patient/guardian.  Questions were answered to patient's/guardian satisfaction.  Red flag signs were reviewed.  Patient/guardian is in agreement with above plan.      Subjective: 58 year old female with history of HTN, hepatic steatosis, allergic rhinitis, high risk HPV (cervical)  who presents to clinic for the following complaints:   Patient presents with:  Leg Swelling: It feels like needle is pinching on her  leg. Pt states it get worse if she walks a lot    Answers submitted by the patient for this visit:  General Questionnaire (Submitted on 5/22/2024)  Chief Complaint: Chronic problems general questions HPI Form  General Concern (Submitted on 5/22/2024)  Chief Complaint: Chronic problems general questions HPI Form  What is the reason for your visit today?: legs swelling  When did your symptoms begin?: 3-4 weeks ago  What are your symptoms?: leg swelling  How would you describe these symptoms?: Moderate  Are your symptoms:: Staying the same  Is there anything that makes you feel worse?: worse when walking  Is there anything that makes you feel better?: relaxing      Patient is currently taking losartan 100mg once daily. Patient has been on this medication since 04/2023 though the dose was increased from 50mg to 100mg once daily on 03/13/2024. She took the higher dose for two weeks before she noticed the swelling. The feet were feeling tight when she was walking. After sleeping for a night, the legs are less swollen. After a full day of walking, the feet gets more swollen and some redness is noted with some pin poking sensations. She likes to eat a lot of salt. She uses a pillow to raise her legs up when she sleeps.     Denies recent travels or prolonged sitting, chest pain, shortness of breath, surgery, fainting, fevers, chills, nausea, coughing, vomiting, diarrhea, changes in diet, changes in urination, or other changes from baseline.     She does not drink traditional/take medications.     BP is controlled now.     The 10 point review of system is negative except as stated in the HPI.    Allergies were reviewed and updated.    Narrative & Impression   EXAM DATE:         04/23/2020     EXAM: US ABDOMEN LIMITED  LOCATION: [facility]  DATE/TIME: 4/23/2020 4:00 PM     INDICATION: epigastric pain, worse with eating - check pancreas, GB  COMPARISON: 4/5/2016 ultrasound  TECHNIQUE: Limited abdominal  ultrasound.     FINDINGS:     GALLBLADDER: Normal. No gallstones, wall thickening, or pericholecystic fluid.  Negative sonographic Min's sign.     BILE DUCTS: No biliary dilatation. The common duct measures 5 mm.     LIVER: Diffuse increased echogenicity compatible with steatosis. No focal mass.     RIGHT KIDNEY: No hydronephrosis.     PANCREAS: The visualized portions are normal.     No ascites.     IMPRESSION:  1.  Diffuse hepatic steatosis.  2.  Normal-appearing pancreas and gallbladder.          Last labs are from 11/2023.      Latest Reference Range & Units 05/22/24 09:35   Sodium 135 - 145 mmol/L 140   Potassium 3.4 - 5.3 mmol/L 3.9   Chloride 98 - 107 mmol/L 103   Carbon Dioxide (CO2) 22 - 29 mmol/L 24   Urea Nitrogen 8.0 - 23.0 mg/dL 14.5   Creatinine 0.51 - 0.95 mg/dL 0.53   GFR Estimate >60 mL/min/1.73m2 >90   Calcium 8.6 - 10.0 mg/dL 9.5   Anion Gap 7 - 15 mmol/L 13   Albumin 3.5 - 5.2 g/dL 4.7   Protein Total 6.4 - 8.3 g/dL 7.9   Alkaline Phosphatase 40 - 150 U/L 109   ALT 0 - 50 U/L 22   AST 0 - 45 U/L 21   Bilirubin Direct 0.00 - 0.30 mg/dL <0.20   Bilirubin Total <=1.2 mg/dL 0.8   Glucose 70 - 99 mg/dL 102 (H)   TSH 0.30 - 4.20 uIU/mL 1.05   WBC 4.0 - 11.0 10e3/uL 5.0   Hemoglobin 11.7 - 15.7 g/dL 14.2   Hematocrit 35.0 - 47.0 % 43.4   Platelet Count 150 - 450 10e3/uL 311   RBC Count 3.80 - 5.20 10e6/uL 5.06   MCV 78 - 100 fL 86   MCH 26.5 - 33.0 pg 28.1   MCHC 31.5 - 36.5 g/dL 32.7   RDW 10.0 - 15.0 % 12.8   % Neutrophils % 46   % Lymphocytes % 44   % Monocytes % 6   % Eosinophils % 2   % Basophils % 1   Absolute Basophils 0.0 - 0.2 10e3/uL 0.1   Absolute Eosinophils 0.0 - 0.7 10e3/uL 0.1   Absolute Immature Granulocytes <=0.4 10e3/uL 0.0   Absolute Lymphocytes 0.8 - 5.3 10e3/uL 2.2   Absolute Monocytes 0.0 - 1.3 10e3/uL 0.3   % Immature Granulocytes % 0   Absolute Neutrophils 1.6 - 8.3 10e3/uL 2.3   Color Urine Colorless, Straw, Light Yellow, Yellow  Yellow   Appearance Urine Clear  Clear  "  Glucose Urine Negative mg/dL Negative   Bilirubin Urine Negative  Negative   Ketones Urine Negative mg/dL Negative   Specific Gravity Urine 1.005 - 1.030  1.015   pH Urine 5.0 - 7.0  5.5   Protein Albumin Urine Negative mg/dL Negative   Urobilinogen Urine 0.2, 1.0 E.U./dL 0.2   Nitrite Urine Negative  Negative   Blood Urine Negative  Trace !   Leukocyte Esterase Urine Negative  Negative   WBC Urine 0-5 /HPF /HPF 0-5   RBC Urine 0-2 /HPF /HPF 0-2   Bacteria Urine None Seen /HPF Few !   Squamous Epithelial /LPF Urine None Seen /LPF Few !   Mucus Urine None Seen /LPF Present !   (H): Data is abnormally high  !: Data is abnormal    Objective:   /89   Pulse 80   Temp 97.8  F (36.6  C) (Oral)   Resp 20   Ht 1.476 m (4' 10.11\")   Wt 65.8 kg (145 lb)   LMP  (LMP Unknown)   SpO2 97%   BMI 30.19 kg/m    General: Active, alert, nontoxic-appearing.  No acute distress.  HEENT: Normocephalic, atraumatic.  Pupils are equal and round.  Sclera is clear.  Normal external ears. Nares patent.  Moist mucous membranes.    Cardiac: RRR.  S1, S2 present.  No murmurs, rubs, or gallops.  Respiratory/chest: Clear to auscultation bilaterally.  No wheezes, rales, rhonchi.  Breathing is not labored.  No accessory muscle usage.  Extremities: Mild +/+2 pitting edema from the distal third of the tibia down to the dorsal foot.  No other areas seem to be swollen.  No redness, increased warmth, tenderness noted palpation.  No pus or discharge.  Voluntary movements intact.  Integumentary: No concerning rash or skin changes appreciated.        Justin Portillo MD  [facility] [place] [address]  Phone: [phone]  Fax: [phone]    5/23/2024  2:49 PM          Current Outpatient Medications   Medication Sig Dispense Refill    acetaminophen (TYLENOL) 500 MG tablet [ACETAMINOPHEN (TYLENOL) 500 MG TABLET] TAKE 2 TABLETS(1000 MG) BY MOUTH EVERY 4 HOURS AS NEEDED FOR PAIN 100 tablet 5    famotidine (PEPCID) 20 " MG tablet Take 1 tablet (20 mg) by mouth 2 times daily as needed (stomach upset) 20 tablet 5    losartan (COZAAR) 100 MG tablet Take 1 tablet (100 mg) by mouth daily 90 tablet 4    cetirizine (ZYRTEC) 10 MG tablet Take 1 tablet (10 mg) by mouth daily (Patient not taking: Reported on 5/22/2024) 90 tablet 1     No current facility-administered medications for this visit.       No Known Allergies    Patient Active Problem List    Diagnosis Date Noted    Cervical high risk HPV (human papillomavirus) test positive 09/20/2023     Priority: Medium     09/13/23 NIL Pap, +HR HPV (not 16 or 18) plan cotest in 1 year due 09/13/24 09/20/23 Left msg with the assistance of [employer]  Id#[id number]  and [employer] result note sent.  09/21/23 Pt called and left a message on my phone and requested a call back with a [employer] . I called the pt with [employer]  Melodie sun answer Left message for patient to call me or view [employer] message.    09/25/23  I called the pt with [employer]  #id [id number] she was notified of the results and recommendations.       Benign essential hypertension 12/10/2019     Priority: Medium    Non-seasonal allergic rhinitis 12/10/2019     Priority: Medium       Family History   Problem Relation Age of Onset    Diabetes No family hx of     Cerebrovascular Disease No family hx of     Acute Myocardial Infarction No family hx of     Breast Cancer No family hx of     Ovarian Cancer No family hx of        Past Surgical History:   Procedure Laterality Date    LAPAROSCOPIC HYSTERECTOMY SUPRACERVICAL  02/25/2010    DAVINCI SUPRACERVICAL HYSTERCTOMY, SACRAL COLPOPEXY WITH MESH, ANTERIOR/POSTERIOR VAGINAL REPAIR/PERINEALPLASTY/CYSTOSCOPY.        Social History     Socioeconomic History    Marital status:      Spouse name: Not on file    Number of children: Not on file    Years of education: Not on file    Highest education level: Not on file   Occupational History    Not on file   Tobacco Use     Smoking status: Never     Passive exposure: Never    Smokeless tobacco: Never   Vaping Use    Vaping status: Never Used   Substance and Sexual Activity    Alcohol use: Not on file    Drug use: Not on file    Sexual activity: Not on file   Other Topics Concern    Not on file   Social History Narrative    Not on file     Social Determinants of Health     Financial Resource Strain: Not on file   Food Insecurity: Not on file   Transportation Needs: Not on file   Physical Activity: Not on file   Stress: Not on file   Social Connections: Not on file   Interpersonal Safety: Low Risk  (3/13/2024)    Interpersonal Safety     Do you feel physically and emotionally safe where you currently live?: Yes     Within the past 12 months, have you been hit, slapped, kicked or otherwise physically hurt by someone?: No     Within the past 12 months, have you been humiliated or emotionally abused in other ways by your partner or ex-partner?: No   Housing Stability: Not on file

## 2024-05-22 NOTE — PATIENT INSTRUCTIONS
-Thank you for choosing the [facility].  -It was a pleasure to see you today.  -Please take a look at the information below for more specific details regarding the treatment plan and recommendations.  -In this after visit summary is a list of your medications and specific instructions.  Please review this carefully as there may be changes made to your medication list.  -If there are any particular questions or concerns, please feel free to reach out to Dr. Portillo.  -If any labs have been completed, we will reach out to you about results.  If the results are normal or not concerning, a letter or [employer] message will be sent to you.  If any follow-up is needed, either Dr. Portillo or the nurse will give you a call.  If you have not heard regarding results after 2 weeks, please reach out to the clinic.    Patient Instructions:    -Wear the compression stockings.   -Follow a low salt diet.   -Try to limit fluid intake to 64 ounces or less each day.   -Further recommendations will depend on lab results.     Please seek immediate medical attention (go to the emergency room or urgent care) for the following reasons: worsening symptoms, chest pain, shortness of breath, dizziness, fainting, or any concerning changes.      --------------------------------------------------------------------------------------------------------------------    -We are always looking for ways to improve.  You may be selected to receive a survey regarding your visit today.  We encourage you to complete the survey and provide specific, constructive feedback to help us improve our processes.  Thank you for your time!  -Please review the contact information listed on the after visit summary and in the electronic chart.  Below is the phone number that we have on file.  If there are any changes that are needed to be made, please reach out to the clinic.  [phone] (home)

## 2024-05-23 PROBLEM — M79.89 SWELLING OF BOTH LOWER EXTREMITIES: Status: ACTIVE | Noted: 2024-05-23

## 2024-05-23 PROBLEM — K76.0 HEPATIC STEATOSIS: Status: ACTIVE | Noted: 2024-05-23

## 2024-06-07 ENCOUNTER — OFFICE VISIT (OUTPATIENT)
Dept: AUDIOLOGY | Facility: CLINIC | Age: 59
End: 2024-06-07
Payer: COMMERCIAL

## 2024-06-07 ENCOUNTER — OFFICE VISIT (OUTPATIENT)
Dept: OTOLARYNGOLOGY | Facility: CLINIC | Age: 59
End: 2024-06-07
Payer: COMMERCIAL

## 2024-06-07 DIAGNOSIS — H93.12 TINNITUS, LEFT: Primary | ICD-10-CM

## 2024-06-07 DIAGNOSIS — H90.3 SENSORINEURAL HEARING LOSS (SNHL) OF BOTH EARS: ICD-10-CM

## 2024-06-07 DIAGNOSIS — H90.3 SENSORINEURAL HEARING LOSS (SNHL) OF BOTH EARS: Primary | ICD-10-CM

## 2024-06-07 PROCEDURE — 92557 COMPREHENSIVE HEARING TEST: CPT | Performed by: AUDIOLOGIST

## 2024-06-07 PROCEDURE — 92567 TYMPANOMETRY: CPT | Performed by: AUDIOLOGIST

## 2024-06-07 PROCEDURE — 99213 OFFICE O/P EST LOW 20 MIN: CPT | Performed by: OTOLARYNGOLOGY

## 2024-06-07 NOTE — PROGRESS NOTES
FOLLOW UP VISIT NOTE      HISTORY OF PRESENT ILLNESS     was seen in follow up after previous 10/24/2023 visit for audiogram review.  She continues experienced left sided tinnitus at night.  No dizziness.  Hearing is stable.  The tinnitus is present during the day but it is not bothersome.         REVIEW OF SYSTEMS    Review of Systems: a 10-system review is reviewed at this encounter.  See scanned document.       No Known Allergies        PHYSICAL EXAM:        HEAD: Normal appearance and symmetry:  No cutaneous lesions.      EARS:        RIGHT: TM intact nl    LEFT:   TM intact nl   NOSE:    Dorsum:   straight       ORAL CAVITY/OROPHARYNX:    Lips:  Normal.     NECK:  Trachea:  midline     NEURO:   Alert and Oriented    GAIT AND STATION:  normal     RESPIRATORY:   Symmetry and Respiratory effort    PSYCH:   normal mood and affect    SKIN:  warm and dry     AUDIOGRAM: stable high frequency loss    IMPRESSION:   Encounter Diagnoses   Name Primary?    Tinnitus, left Yes    Sensorineural hearing loss (SNHL) of both ears          RECOMMENDATIONS:    Stable audiogram.  Left sided tinnitus, persistent  Discussed masking strategies.   Return annually for hearing test

## 2024-06-07 NOTE — PATIENT INSTRUCTIONS
You were seen in the ENT Clinic today by Dr. Rizo. If you have any questions or concerns after your appointment, please contact us (see below)      2.   Please return to clinic next year for an annual hearing test.     3. Try some background noise (fan or music) at bedtime      How to Contact Us:  Send a [employer] message to your provider. Our team will respond to you via [employer]. Occasionally, we will need to call you to get further information.  For urgent matters (Monday-Friday), call the ENT Clinic: [phone] and speak with a call center team member - they will route your call appropriately.   If you'd like to speak directly with a nurse, please find our contact information below. We do our best to check voicemail frequently throughout the day, and will work to call you back within 1-2 days. For urgent matters, please use the general clinic phone numbers listed above.      Ruth Hernandez RN  ENT RN   [phone]

## 2024-06-07 NOTE — PROGRESS NOTES
AUDIOLOGY REPORT     SUMMARY: Audiology visit completed. See audiogram for results.       RECOMMENDATIONS: Follow-up with ENT.    Jen Taveras, CCC-A  [place] Licensed Audiologist #[id number]

## 2024-06-07 NOTE — LETTER
6/7/2024      Se Menendez  [address] [place] [address]      Dear Colleague,    Thank you for referring your patient, Se Menendez, to the [facility]. Please see a copy of my visit note below.    FOLLOW UP VISIT NOTE      HISTORY OF PRESENT ILLNESS     was seen in follow up after previous 10/24/2023 visit for audiogram review.  She continues experienced left sided tinnitus at night.  No dizziness.  Hearing is stable.  The tinnitus is present during the day but it is not bothersome.         REVIEW OF SYSTEMS    Review of Systems: a 10-system review is reviewed at this encounter.  See scanned document.       No Known Allergies        PHYSICAL EXAM:        HEAD: Normal appearance and symmetry:  No cutaneous lesions.      EARS:        RIGHT: TM intact nl    LEFT:   TM intact nl   NOSE:    Dorsum:   straight       ORAL CAVITY/OROPHARYNX:    Lips:  Normal.     NECK:  Trachea:  midline     NEURO:   Alert and Oriented    GAIT AND STATION:  normal     RESPIRATORY:   Symmetry and Respiratory effort    PSYCH:   normal mood and affect    SKIN:  warm and dry     AUDIOGRAM: stable high frequency loss    IMPRESSION:   Encounter Diagnoses   Name Primary?     Tinnitus, left Yes     Sensorineural hearing loss (SNHL) of both ears          RECOMMENDATIONS:    Stable audiogram.  Left sided tinnitus, persistent  Discussed masking strategies.   Return annually for hearing test        Again, thank you for allowing me to participate in the care of your patient.        Sincerely,        Fadi Rizo MD

## 2024-06-23 ENCOUNTER — HEALTH MAINTENANCE LETTER (OUTPATIENT)
Age: 59
End: 2024-06-23

## 2024-07-31 ENCOUNTER — APPOINTMENT (OUTPATIENT)
Dept: INTERPRETER SERVICES | Facility: CLINIC | Age: 59
End: 2024-07-31
Payer: COMMERCIAL

## 2024-07-31 ENCOUNTER — TELEPHONE (OUTPATIENT)
Dept: MAMMOGRAPHY | Facility: CLINIC | Age: 59
End: 2024-07-31
Payer: COMMERCIAL

## 2024-07-31 NOTE — TELEPHONE ENCOUNTER
Patient Quality Outreach    Patient is due for the following:   Breast Cancer Screening - Mammogram    Next Steps:   Schedule a office visit for mammo    Type of outreach:    Phone, left message for patient/parent to call back.    Next Steps:  Reach out within 90 days via Phone.    Max number of attempts reached: No. Will try again in 90 days if patient still on fail list.    Questions for provider review:    None           SHASHI Mills  Chart routed to Care Team.

## 2024-08-05 ENCOUNTER — ANCILLARY PROCEDURE (OUTPATIENT)
Dept: MAMMOGRAPHY | Facility: CLINIC | Age: 59
End: 2024-08-05
Attending: FAMILY MEDICINE
Payer: COMMERCIAL

## 2024-08-05 DIAGNOSIS — Z12.31 ENCOUNTER FOR SCREENING MAMMOGRAM FOR BREAST CANCER: ICD-10-CM

## 2024-08-05 PROCEDURE — 77067 SCR MAMMO BI INCL CAD: CPT | Mod: TC | Performed by: STUDENT IN AN ORGANIZED HEALTH CARE EDUCATION/TRAINING PROGRAM

## 2024-08-05 PROCEDURE — T1013 SIGN LANG/ORAL INTERPRETER: HCPCS | Mod: U4

## 2024-08-08 ENCOUNTER — ANCILLARY PROCEDURE (OUTPATIENT)
Dept: MAMMOGRAPHY | Facility: CLINIC | Age: 59
End: 2024-08-08
Attending: FAMILY MEDICINE
Payer: COMMERCIAL

## 2024-08-08 DIAGNOSIS — N64.89 BREAST ASYMMETRY: ICD-10-CM

## 2024-08-08 PROCEDURE — 77061 BREAST TOMOSYNTHESIS UNI: CPT | Mod: RT

## 2024-09-03 ENCOUNTER — OFFICE VISIT (OUTPATIENT)
Dept: FAMILY MEDICINE | Facility: CLINIC | Age: 59
End: 2024-09-03
Payer: COMMERCIAL

## 2024-09-03 VITALS
WEIGHT: 143.08 LBS | TEMPERATURE: 97.1 F | BODY MASS INDEX: 30.03 KG/M2 | RESPIRATION RATE: 18 BRPM | SYSTOLIC BLOOD PRESSURE: 124 MMHG | DIASTOLIC BLOOD PRESSURE: 87 MMHG | OXYGEN SATURATION: 99 % | HEIGHT: 58 IN | HEART RATE: 78 BPM

## 2024-09-03 DIAGNOSIS — Z29.89 NEED FOR MALARIA PROPHYLAXIS: ICD-10-CM

## 2024-09-03 DIAGNOSIS — Z71.84 ENCOUNTER FOR COUNSELING FOR TRAVEL: Primary | ICD-10-CM

## 2024-09-03 PROBLEM — R87.810 CERVICAL HIGH RISK HPV (HUMAN PAPILLOMAVIRUS) TEST POSITIVE: Status: ACTIVE | Noted: 2023-09-20

## 2024-09-03 PROCEDURE — 99401 PREV MED CNSL INDIV APPRX 15: CPT | Mod: 4MD | Performed by: FAMILY MEDICINE

## 2024-09-03 PROCEDURE — T1013 SIGN LANG/ORAL INTERPRETER: HCPCS | Mod: U4 | Performed by: INTERPRETER

## 2024-09-03 PROCEDURE — T1013 SIGN LANG/ORAL INTERPRETER: HCPCS | Mod: U4

## 2024-09-03 RX ORDER — ATOVAQUONE AND PROGUANIL HYDROCHLORIDE 250; 100 MG/1; MG/1
1 TABLET, FILM COATED ORAL DAILY
Qty: 38 TABLET | Refills: 0 | Status: SHIPPED | OUTPATIENT
Start: 2024-09-03

## 2024-09-03 RX ORDER — AZITHROMYCIN 500 MG/1
500 TABLET, FILM COATED ORAL DAILY
Qty: 3 TABLET | Refills: 0 | Status: SHIPPED | OUTPATIENT
Start: 2024-09-03 | End: 2024-09-06

## 2024-09-03 NOTE — PROGRESS NOTES
"  Assessment & Plan     Encounter for counseling for travel    She declined typhoid vaccine.    - azithromycin (ZITHROMAX) 500 MG tablet  Dispense: 3 tablet; Refill: 0    Need for malaria prophylaxis    - atovaquone-proguanil (MALARONE) 250-100 MG tablet  Dispense: 38 tablet; Refill: 0            BMI  Estimated body mass index is 29.79 kg/m  as calculated from the following:    Height as of this encounter: 1.476 m (4' 10.11\").    Weight as of this encounter: 64.9 kg (143 lb 1.3 oz).             Joycelyn Vazquez is a 59 year old, presenting for the following health issues:  TRAVEL CONSULT ([place] AND [place] ON 9/9/-10/08/2024/WOULD LIKE A REFILL ON ALL HER MEDICATIONS )        9/3/2024     8:44 AM   Additional Questions   Roomed by Maddi SOOD MA   Accompanied by SELF     HPI           Going to [place] and [place].  Went last year also.    Current Outpatient Medications   Medication Sig Dispense Refill    acetaminophen (TYLENOL) 500 MG tablet [ACETAMINOPHEN (TYLENOL) 500 MG TABLET] TAKE 2 TABLETS(1000 MG) BY MOUTH EVERY 4 HOURS AS NEEDED FOR PAIN 100 tablet 5    atovaquone-proguanil (MALARONE) 250-100 MG tablet Take 1 tablet by mouth daily. Start 2 days before travel and continue 7 days after return. 38 tablet 0    famotidine (PEPCID) 20 MG tablet Take 1 tablet (20 mg) by mouth 2 times daily as needed (stomach upset) 20 tablet 5    losartan (COZAAR) 100 MG tablet Take 1 tablet (100 mg) by mouth daily 90 tablet 4    cetirizine (ZYRTEC) 10 MG tablet Take 1 tablet (10 mg) by mouth daily (Patient not taking: Reported on 5/22/2024) 90 tablet 1           Objective    /87   Pulse 78   Temp 97.1  F (36.2  C) (Temporal)   Resp 18   Ht 1.476 m (4' 10.11\")   Wt 64.9 kg (143 lb 1.3 oz)   LMP  (LMP Unknown)   SpO2 99%   BMI 29.79 kg/m    Body mass index is 29.79 kg/m .  Physical Exam     Heart normal  Lungs normal          Signed Electronically by: Morales Cano MD    "

## 2024-12-17 ENCOUNTER — OFFICE VISIT (OUTPATIENT)
Dept: FAMILY MEDICINE | Facility: CLINIC | Age: 59
End: 2024-12-17
Payer: COMMERCIAL

## 2024-12-17 VITALS
SYSTOLIC BLOOD PRESSURE: 148 MMHG | DIASTOLIC BLOOD PRESSURE: 96 MMHG | HEART RATE: 78 BPM | BODY MASS INDEX: 29.83 KG/M2 | RESPIRATION RATE: 20 BRPM | TEMPERATURE: 98.8 F | HEIGHT: 58 IN | WEIGHT: 142.12 LBS | OXYGEN SATURATION: 98 %

## 2024-12-17 DIAGNOSIS — Z00.00 ROUTINE GENERAL MEDICAL EXAMINATION AT A HEALTH CARE FACILITY: Primary | ICD-10-CM

## 2024-12-17 DIAGNOSIS — R52 PAIN: ICD-10-CM

## 2024-12-17 DIAGNOSIS — R10.9 ABDOMINAL DISCOMFORT: ICD-10-CM

## 2024-12-17 DIAGNOSIS — I10 BENIGN ESSENTIAL HYPERTENSION: ICD-10-CM

## 2024-12-17 DIAGNOSIS — Z12.4 CERVICAL CANCER SCREENING: ICD-10-CM

## 2024-12-17 PROBLEM — M79.89 SWELLING OF BOTH LOWER EXTREMITIES: Status: RESOLVED | Noted: 2024-05-23 | Resolved: 2024-12-17

## 2024-12-17 PROCEDURE — T1013 SIGN LANG/ORAL INTERPRETER: HCPCS

## 2024-12-17 RX ORDER — FAMOTIDINE 20 MG/1
20 TABLET, FILM COATED ORAL 2 TIMES DAILY PRN
Qty: 20 TABLET | Refills: 5 | Status: SHIPPED | OUTPATIENT
Start: 2024-12-17

## 2024-12-17 RX ORDER — ACETAMINOPHEN 500 MG
TABLET ORAL
Qty: 100 TABLET | Refills: 5 | Status: SHIPPED | OUTPATIENT
Start: 2024-12-17

## 2024-12-17 RX ORDER — LOSARTAN POTASSIUM 100 MG/1
100 TABLET ORAL DAILY
Qty: 90 TABLET | Refills: 4 | Status: SHIPPED | OUTPATIENT
Start: 2024-12-17

## 2024-12-17 NOTE — PATIENT INSTRUCTIONS
"Patient Education   Makayla Jabari Xeeb Saib Xyuas Kom Tiv Thaiv Tus Kheej  Nov yog ib co makayla jabari xeeb uas peb yeej meem muab leoncio tib neeg pab lawv noj qab nyob zoo. Trudy zaum koj pab pawg saib xyuas yuav muaj ib co makayla jabari xeeb uas tshwj xeeb leoncio koj nkaus xwb. Thov nrog koj pab pawg saib xyuas sib ana txog trudy yam uas koj toob coby kom tiv thaiv koj tus kheej.  Adolfo Coj Lub Neej  Es xaws xais ntau vee 150 feeb txhua lub kincaid tiam (30 feeb txhua hnub, 5 hnub ib lub kincaid tiam).  Ua yam pab cov leeg muaj zog tuaj 2 zaug txhua lub kincaid tiam. Trudy yam no pab koj tswj hwm koj lub cev qhov hnyav thiab tiv thaiv ntawm kab mob.  Txhob haus luam yeeb.  Pleev tshuaj tiv thaiv tshav kub kom thiaj tsis raug mob khees xaws ntawm nqaij tawv.  Txhua 2 mus leoncio 4 xyoos yuav tau kuaj koj lub tsev leoncio qhov radon. Radon yog ib conor jailene uas tsis muaj xim tsis muaj ntxhiab uas mob tau koj cov ntsws. Kom thiaj kawm ntxiv, mus saib [email or web address] thiab ntaus ntawv \"Radon in Homes.\"  Ceev cov phom kom tsis muaj mos txwv leoncio hauv thiab muab xauv navin hauv ib qho chaw nyab xeeb xws li lub txhoj, los yog muab xauv navin thiab muab cov yawm sij zais navin. Muab cov mos txwv xauv leoncio hauv lwm qhov chaw nrug cov phom. Kom thiaj kawm ntxiv, mus saib [email or web address] thiab ntaus ntawv \"safe gun storage.\"  Adolfo Noj Qab Huv  Noj 5 qho txiv hmab txiv ntoo thiab zaub txhua hnub.  Noj nplem nplej, txhuv xim av thiab cov fawm muaj nplej (los theej nplem dawb, txhuv dawb, thiab fawm dawb).  Ua tib zoo noj calcium thiab vitamin D ntau. Saib cov ntawv lo leoncio pob khoom noj thiab siv zog noj kom txog 100% RDA (qhov ntau hauv ib hnub).  Yeej meem kuaj ntsuas  Txhua 6 lub hli mus kuaj hniav thiab muab tu.  Txhua xyoo mus saib koj pab pawg saib xyuas adolfo noj qab nyob zoo kom sib ana txog:  Ib yam dab tsi uas hloov ntawm koj txoj adolfo noj qab nyob zoo.  Cov tshuaj uas koj pab pawg tau hais kom siv.  Adolfo saib xyuas kom tiv thaiv, adolfo npaj leoncio tsev neeg, thiab yuav ua li jimy tiv " thaiv ntawm kab mob uas ntev.  Adolfo txhaj tshuaj (koob tshuaj tiv thaiv)   Cov koob tshuaj HPV (txog thaum muaj 25 xyoo), yog koj yeej tsis tau txais vee li.  Cov koob tshuaj Hepatitis B Kab Mob Siab (txog thaum muaj 58 xyoos), yog koj yeej tsis tau txais vee li.  Koob tshuaj COVID-19: Txais koob tshuaj no thaum txog caij txais.  Koob tshuaj tiv thaiv ntawm mob khaub thuas: Txais txhua xyoo.  Koob tshuaj tiv thaiv mob daig tsaig: Txais txhua 10 xyoo.  Pneumococcal, hepatitis A, thiab RSV cov koob tshuaj: Nug koj pab pawg saib xyuas rosario puas tsim nyog leoncio koj txais cov no raws li koj qhov pheej hmoo.  Koob tshuaj tiv thaiv ntawm kab mob sawv hlwv (leoncio cov muaj 49 xyoo rov [place]).  Adolfo kuaj ntsuas leoncio adolfo noj qab nyob zoo  Kuaj mob ntshav qab zib:  Pib thaum muaj 34 xyoos, Mus kuaj mob ntshav qab zib txhua 3 xyoos los yog ntau zog.  Yog koj tseem tsis tau txog 34 xyoos, nug koj pab pawg saib xyuas rosario puas tsim nyog leoncio koj kuaj mob ntshav qab zib.  Kuaj cholesterol: Thaum muaj 38 xyoo, pib kuaj cholesterol txhua 5 xyoos, los yog ntau vee ntawd yog kws toshia mob qhia.  Kuaj txha qhov tuab (DEXA): Thaum txog 49 xyoo lawd, nug koj pab pawg saib xyuas rosario puas tsim nyog leoncio koj kuaj txha rosario puas ruaj.  Kab Mob Siab Hepatitis C: Kuaj ib zaug hauv koj lub neej.  Kuaj Txoj Hlab Ntshav Hauv Plab: Nrog koj tus kws toshia mob ana txog adolfo kuaj ntsuas no yog koj:  Tau haus luam yeeb ib zaug li; thiab  Yog txiv neej; thiab  Nruab hnub nyoog 65 thiab 75.  Mob Coby Cees (kis mob dhau ntawm adolfo sib deev)  Ua ntej muaj 23 xyoos: Nug koj pab pawg saib xyuas rosario puas tsim nyog kuaj leoncio mob coby cees.  Cristo qab muaj 23 xyoos: Mus kuaj leoncio mob coby cees yog koj muaj adolfo pheej hmoo. Koj muaj adolfo pheej hmoo leoncio mob coby cees (suav nrog HIV) yog:  Koj sib deev nrog ntau vee ib tug neeg.  Koj tsis siv cov hnab looj qau thaum sib deev.  Koj los yog koj tus khub deev kuaj pom tias muaj mob coby cees lawm.  Yog koj muaj adolfo pheej hmoo leoncio mob coby cees  HIV, nug txog cov tshuaj PrEP kom tiv thaiv ntawm HIV.  Mus kuaj leoncio mob coby cees HIV yam ntau vee ib zaug hauv koj lub neej, txawm yog koj muaj adolfo pheej hmoo leoncio mob coby cees HIV los tsis muaj los xij.  Kuaj leoncio mob khees xaws  Kuaj lub ncauj tsev me nyuam leoncio mob khees xaws: Yog koj muaj ib lub ncauj tsev me nyuam, elana yuav tau ib sij kuaj lub ncauj tsev me nyuam seb puas muaj mob khees xaws pib thaum muaj 20 xyoos. Neeg feem coob uas ib sij kuaj lub ncauj tsev me nyuam thiab tsis pom dab tsi txawv lawv tsum tau salud qab muaj 64 xyoos. Nrog koj tus kws toshia mob sib ana txog qhov no.  Kuaj lub mis leoncio mob khees xaws (mammogram): Yog koj tau muaj mis vee li, yuav tau ib sij kuaj lub mis pib thaum muaj 39 xyoo. Adolfo kuaj no yog kom saib seb puas muaj mob khees xaws hauv lub mis.  Kuaj Txoj Hnyuv Leoncio Mob Khees Xaws: Yeej tseem ceeb pib kuaj txoj hnyuv leoncio mob khees xaws pib thaum muaj 44 xyoos.  Txhua 9 xyoo yuav tau kuaj txoj hnyuv (los yog ntau zog yog koj muaj adolfo pheej hmoo) Los sis, nug koj tus kws toshia mob txog adolfo kuaj thooj quav FIT txhua xyoo los yog Cologuard txhua 2 xyoos.  Kom thiaj kawm ntxiv txog trudy conro kuaj no, mus saib: [email or web address].  Yog xav tau adolfo pab txog qhov no, mus saib: bit.ly/ho54017.  Kuaj lub dwight kua phev (prostate) leoncio mob khees xaws: Yog koj muaj ib lub dwight kua phev (prostate) thiab nruab hnub nyoog 54 mus leoncio 68, nug koj tus kws toshia mob rosario puas tsim nyog leoncio koj kuaj lub dwight kua phev.  Kuaj ntsws leoncio mob khees xaws: Yog koj haus luam yeeb aponte sim no los yog tau haus yav tas los uas muaj hnub nyoog nruab 50 xyoos mus leoncio 79 xyoo, nug koj pab pawg saib xyuas rosario puas tsim nyog leoncio koj yeej meem kuaj lub ntsws leoncio mob khees xaws.    Leoncio cov conor phiaj adolfo siv ua ntaub ntawv qhia nkaus xwb. Yuav tsis pauv adolfo qhia los ntawm koj qhov chaw toshia mob. Copyright (yuliana jackson)   2023 [facility].   Txhua txoj manuel raug tswj tseg lawm. Tshaj xyuas los ntawm [facility]  [place] [facility]. [employer] [id number] - REV 04/24.  Preventive Care Advice   This is general advice given by our system to help you stay healthy. However, your care team may have specific advice just for you. Please talk to your care team about your preventive care needs.  Nutrition  Eat 5 or more servings of fruits and vegetables each day.  Try wheat bread, brown rice and whole grain pasta (instead of white bread, rice, and pasta).  Get enough calcium and vitamin D. Check the label on foods and aim for 100% of the RDA (recommended daily allowance).  Lifestyle  Exercise at least 150 minutes each week  (30 minutes a day, 5 days a week).  Do muscle strengthening activities 2 days a week. These help control your weight and prevent disease.  No smoking.  Wear sunscreen to prevent skin cancer.  Have a dental exam and cleaning every 6 months.  Yearly exams  See your health care team every year to talk about:  Any changes in your health.  Any medicines your care team has prescribed.  Preventive care, family planning, and ways to prevent chronic diseases.  Shots (vaccines)   HPV shots (up to age 26), if you've never had them before.  Hepatitis B shots (up to age 59), if you've never had them before.  COVID-19 shot: Get this shot when it's due.  Flu shot: Get a flu shot every year.  Tetanus shot: Get a tetanus shot every 10 years.  Pneumococcal, hepatitis A, and RSV shots: Ask your care team if you need these based on your risk.  Shingles shot (for age 50 and up)  General health tests  Diabetes screening:  Starting at age 35, Get screened for diabetes at least every 3 years.  If you are younger than age 35, ask your care team if you should be screened for diabetes.  Cholesterol test: At age 39, start having a cholesterol test every 5 years, or more often if advised.  Bone density scan (DEXA): At age 50, ask your care team if you should have this scan for osteoporosis (brittle bones).  Hepatitis C: Get tested at  least once in your life.  STIs (sexually transmitted infections)  Before age 24: Ask your care team if you should be screened for STIs.  After age 24: Get screened for STIs if you're at risk. You are at risk for STIs (including HIV) if:  You are sexually active with more than one person.  You don't use condoms every time.  You or a partner was diagnosed with a sexually transmitted infection.  If you are at risk for HIV, ask about PrEP medicine to prevent HIV.  Get tested for HIV at least once in your life, whether you are at risk for HIV or not.  Cancer screening tests  Cervical cancer screening: If you have a cervix, begin getting regular cervical cancer screening tests starting at age 21.  Breast cancer scan (mammogram): If you've ever had breasts, begin having regular mammograms starting at age 40. This is a scan to check for breast cancer.  Colon cancer screening: It is important to start screening for colon cancer at age 45.  Have a colonoscopy test every 10 years (or more often if you're at risk) Or, ask your provider about stool tests like a FIT test every year or Cologuard test every 3 years.  To learn more about your testing options, visit:   .  For help making a decision, visit:   https://bit.ly/yy20992.  Prostate cancer screening test: If you have a prostate, ask your care team if a prostate cancer screening test (PSA) at age 55 is right for you.  Lung cancer screening: If you are a current or former smoker ages 50 to 80, ask your care team if ongoing lung cancer screenings are right for you.  For informational purposes only. Not to replace the advice of your health care provider. Copyright   2023 [employer]. All rights reserved. Clinically reviewed by the [facility]. [employer] [id number] - REV 01/24.

## 2024-12-17 NOTE — PROGRESS NOTES
"Preventive Care Visit  [facility] NOAH Stratton MD, Family Medicine  Dec 17, 2024      Assessment & Plan     Routine general medical examination at a health care facility  See below.  Up to date on breast cancer screening, colon cancer screening.  Checking Pap smear as below.    Benign essential hypertension  Blood pressure was high today, but she reports to being much better at home.  I was reluctant to change her medications today with home blood pressures normal and as she was potentially nervous for today's visit.  Will first have her come back to clinic in about 2 weeks for blood pressure check and then if blood pressure is still high at that time, would add HCTZ 12.5 milligrams daily.  Would potentially avoid amlodipine given history of some lower extremity edema at baseline.  - losartan (COZAAR) 100 MG tablet  Dispense: 90 tablet; Refill: 4    Abdominal discomfort  Patient requests famotidine to use as needed; refilled  - famotidine (PEPCID) 20 MG tablet  Dispense: 20 tablet; Refill: 5    Pain  Med refilled  - acetaminophen (TYLENOL) 500 MG tablet  Dispense: 100 tablet; Refill: 5    Cervical cancer screening  Last Pap smear showed other high risk HPV 1 year ago; rechecking Pap today.  Further management will depend on results.  - HPV and Gynecologic Cytology Panel - Recommended Age 30 - 65 Years            BMI  Estimated body mass index is 29.43 kg/m  as calculated from the following:    Height as of this encounter: 1.48 m (4' 10.27\").    Weight as of this encounter: 64.5 kg (142 lb 1.9 oz).   Weight management plan: Discussed healthy diet and exercise guidelines      Nurse visit in 2 weeks to recheck blood pressure, office visit in 6 months    Subjective    is a 59 year old, presenting for the following:  Physical, Gyn Exam (pap), and Recheck Medication (Refills for all)        12/17/2024     8:15 AM   Additional Questions   Roomed by paw p   Accompanied by self         12/17/2024 "   Forms   Any forms needing to be completed Yes             HPI    BP at home 130's.  Never 140's.    Traveled abroad and was hiking and feeling well.  Now back home and is less active and eating more.                  12/17/2024   General Health   How would you rate your overall physical health? Good            12/17/2024   Nutrition   Three or more servings of calcium each day? (!) NO   Diet: Low salt    Low fat/cholesterol   How many servings of fruit and vegetables per day? (!) 2-3   How many sweetened beverages each day? 0-1       Multiple values from one day are sorted in reverse-chronological order         9/13/2023   Exercise   Frequency of exercise: None            12/17/2024   Social Factors   Worry food won't last until get money to buy more No   Food not last or not have enough money for food? No   Do you have housing? (Housing is defined as stable permanent housing and does not include staying ouside in a car, in a tent, in an abandoned building, in an overnight shelter, or couch-surfing.) Yes   Are you worried about losing your housing? No   Lack of transportation? No   Unable to get utilities (heat,electricity)? No            12/17/2024   Fall Risk   Fallen 2 or more times in the past year? No    Trouble with walking or balance? No        Patient-reported          12/17/2024   Dental   Dentist two times every year? Yes            12/17/2024   TB Screening   Were you born outside of the [place]? Yes              Today's PHQ-2 Score:       3/13/2024     9:47 AM   PHQ-2 ( 1999 Pfizer)   Q1: Little interest or pleasure in doing things 0    Q2: Feeling down, depressed or hopeless 0    PHQ-2 Score 0   Q1: Little interest or pleasure in doing things Not at all   Q2: Feeling down, depressed or hopeless Not at all   PHQ-2 Score 0       Patient-reported         12/17/2024   Substance Use   Alcohol more than 3/day or more than 7/wk No   Do you use any other substances recreationally? No        Social History      Tobacco Use    Smoking status: Never     Passive exposure: Never    Smokeless tobacco: Never   Vaping Use    Vaping status: Never Used           8/5/2024   LAST FHS-7 RESULTS   1st degree relative breast or ovarian cancer No   Any relative bilateral breast cancer No   Any male have breast cancer No   Any ONE woman have BOTH breast AND ovarian cancer No   Any woman with breast cancer before 50yrs No   2 or more relatives with breast AND/OR ovarian cancer No   2 or more relatives with breast AND/OR bowel cancer No           Mammogram Screening - Mammogram every 1-2 years updated in Health Maintenance based on mutual decision making        12/17/2024   STI Screening   New sexual partner(s) since last STI/HIV test? No        History of abnormal Pap smear: YES - reflected in Problem List and Health Maintenance accordingly        Latest Ref Rng & Units 9/13/2023    10:51 AM   PAP / HPV   PAP  Negative for Intraepithelial Lesion or Malignancy (NILM)    HPV 16 DNA Negative Negative    HPV 18 DNA Negative Negative    Other HR HPV Negative Positive      ASCVD Risk   The 10-year ASCVD risk score (Sweta GTZ, et al., 2019) is: 7.9%    Values used to calculate the score:      Age: 59 years      Sex: Female      Is Non- : No      Diabetic: No      Tobacco smoker: No      Systolic Blood Pressure: 148 mmHg      Is BP treated: Yes      HDL Cholesterol: 38 mg/dL      Total Cholesterol: 215 mg/dL           Reviewed and updated as needed this visit by Provider      Problems               No past medical history on file.  Past Surgical History:   Procedure Laterality Date    LAPAROSCOPIC HYSTERECTOMY SUPRACERVICAL  02/25/2010    DAVINCI SUPRACERVICAL HYSTERCTOMY, SACRAL COLPOPEXY WITH MESH, ANTERIOR/POSTERIOR VAGINAL REPAIR/PERINEALPLASTY/CYSTOSCOPY.     OB History   No obstetric history on file.     Patient Active Problem List   Diagnosis    Benign essential hypertension    Non-seasonal allergic  "rhinitis    Cervical high risk HPV (human papillomavirus) test positive    Hepatic steatosis     Past Surgical History:   Procedure Laterality Date    LAPAROSCOPIC HYSTERECTOMY SUPRACERVICAL  02/25/2010    DAVINCI SUPRACERVICAL HYSTERCTOMY, SACRAL COLPOPEXY WITH MESH, ANTERIOR/POSTERIOR VAGINAL REPAIR/PERINEALPLASTY/CYSTOSCOPY.       Social History     Tobacco Use    Smoking status: Never     Passive exposure: Never    Smokeless tobacco: Never   Substance Use Topics    Alcohol use: Not on file     Family History   Problem Relation Age of Onset    Diabetes No family hx of     Cerebrovascular Disease No family hx of     Acute Myocardial Infarction No family hx of     Breast Cancer No family hx of     Ovarian Cancer No family hx of     Colon Cancer No family hx of          Current Outpatient Medications   Medication Sig Dispense Refill    acetaminophen (TYLENOL) 500 MG tablet [ACETAMINOPHEN (TYLENOL) 500 MG TABLET] TAKE 2 TABLETS(1000 MG) BY MOUTH EVERY 4 HOURS AS NEEDED FOR PAIN 100 tablet 5    famotidine (PEPCID) 20 MG tablet Take 1 tablet (20 mg) by mouth 2 times daily as needed (stomach upset). 20 tablet 5    losartan (COZAAR) 100 MG tablet Take 1 tablet (100 mg) by mouth daily. 90 tablet 4    cetirizine (ZYRTEC) 10 MG tablet Take 1 tablet (10 mg) by mouth daily (Patient not taking: Reported on 12/17/2024) 90 tablet 1     No Known Allergies         Objective    Exam  BP (!) 148/96   Pulse 78   Temp 98.8  F (37.1  C) (Oral)   Resp 20   Ht 1.48 m (4' 10.27\")   Wt 64.5 kg (142 lb 1.9 oz)   LMP  (LMP Unknown)   SpO2 98%   BMI 29.43 kg/m     Estimated body mass index is 29.43 kg/m  as calculated from the following:    Height as of this encounter: 1.48 m (4' 10.27\").    Weight as of this encounter: 64.5 kg (142 lb 1.9 oz).    Physical Exam  GENERAL: alert and no distress  EYES: Eyes grossly normal to inspection, PERRL and conjunctivae and sclerae normal  HENT: ear canals and TM's normal, nose and mouth without " ulcers or lesions  NECK: no adenopathy, no asymmetry, masses, or scars  RESP: lungs clear to auscultation - no rales, rhonchi or wheezes  CV: regular rate and rhythm, normal S1 S2, no S3 or S4, no murmur, click or rub, no peripheral edema  ABDOMEN: soft, nontender, no hepatosplenomegaly, no masses and bowel sounds normal   (female) w/bimanual: normal female external genitalia, normal urethral meatus, normal vaginal mucosa, and normal cervix/adnexa/uterus without masses or discharge  MS: no gross musculoskeletal defects noted, no edema  SKIN: no suspicious lesions or rashes  NEURO: Normal strength and tone, mentation intact and speech normal  PSYCH: mentation appears normal, affect normal/bright        Signed Electronically by: Cierra Stratton MD

## 2024-12-18 LAB
HPV HR 12 DNA CVX QL NAA+PROBE: POSITIVE
HPV16 DNA CVX QL NAA+PROBE: NEGATIVE
HPV18 DNA CVX QL NAA+PROBE: NEGATIVE
HUMAN PAPILLOMA VIRUS FINAL DIAGNOSIS: ABNORMAL

## 2024-12-23 ENCOUNTER — APPOINTMENT (OUTPATIENT)
Dept: INTERPRETER SERVICES | Facility: CLINIC | Age: 59
End: 2024-12-23
Payer: COMMERCIAL

## 2024-12-23 ENCOUNTER — TELEPHONE (OUTPATIENT)
Dept: FAMILY MEDICINE | Facility: CLINIC | Age: 59
End: 2024-12-23
Payer: COMMERCIAL

## 2024-12-23 NOTE — TELEPHONE ENCOUNTER
----- Message from Robyn MEYERS sent at 12/23/2024  9:40 AM CST -----  PLEASE CALL PATIENT WITH [employer]  AND SCHEDULE COLPSOCOPY WITH SHANI CLOUD.    Routed to [facility].    Pt. Advised of results and recommendation for colposcopy.  She voices understanding and will comply.  Pt advised to take Ibuprofen or Tylenol 1 hour before procedure if she has no allergies or contraindications. Nothing vaginally for 24 before procedure. All questions answered to patients satisfaction today.      Robyn Crain, RN, BSN-Pap Tracking Nurse

## 2024-12-24 NOTE — TELEPHONE ENCOUNTER
"Dr. Stratton,    Are you able to fit patient in using an Approval slot.  Per note \"Plan colp due before 3/17/25 \", Your next opening is in April.  Dr. Wright does have an opening on 3/18/2025.  Message however stated to schedule with you.    Please advice,    Thank you,  Lesley Zarate  "

## 2024-12-30 ENCOUNTER — ALLIED HEALTH/NURSE VISIT (OUTPATIENT)
Dept: FAMILY MEDICINE | Facility: CLINIC | Age: 59
End: 2024-12-30
Payer: COMMERCIAL

## 2024-12-30 VITALS — SYSTOLIC BLOOD PRESSURE: 129 MMHG | HEART RATE: 72 BPM | DIASTOLIC BLOOD PRESSURE: 84 MMHG

## 2024-12-30 DIAGNOSIS — I10 HYPERTENSION, UNSPECIFIED TYPE: Primary | ICD-10-CM

## 2024-12-30 PROCEDURE — 99207 PR NO CHARGE NURSE ONLY: CPT

## 2024-12-30 NOTE — PROGRESS NOTES
I met with Se Menendez at the request of Dr. Stratton  to recheck her blood pressure.  Blood pressure medications on the med list were reviewed with patient.    Patient has taken all medications as per usual regimen: Yes  Patient reports tolerating them without any issues or concerns: Yes    Vitals:    12/30/24 0903   BP: 129/84   BP Location: Left arm   Patient Position: Sitting   Cuff Size: Adult Regular   Pulse: 72       Blood pressure was taken, previous encounter was reviewed, recorded blood pressure below 140/90.  Patient was discharged and the note will be sent to the provider for final review.        Kath Burgos, MSN, RN   [facility]

## 2025-01-20 ENCOUNTER — TELEPHONE (OUTPATIENT)
Dept: FAMILY MEDICINE | Facility: CLINIC | Age: 60
End: 2025-01-20

## 2025-01-20 NOTE — TELEPHONE ENCOUNTER
General Call      Reason for Call:  Reschedule Colposcopy    What are your questions or concerns:  Pt has an appt for a colposcopy 01/21./25 but will need to cancel as she did not feel well. Pt would like someone to call her back to reschedule that appointment.     Date of last appointment with provider: 12/17/2024    Could we send this information to you in [place] or would you prefer to receive a phone call?:   Patient would prefer a phone call   Okay to leave a detailed message?: Yes at Cell number on file:    Telephone Information:   Mobile [phone]   Mobile Not on file.

## 2025-01-21 NOTE — TELEPHONE ENCOUNTER
Spoke to patient using language line and help schedule the [place] at the [facility].     Done    Lesley Zarate

## 2025-01-30 ENCOUNTER — OFFICE VISIT (OUTPATIENT)
Dept: FAMILY MEDICINE | Facility: CLINIC | Age: 60
End: 2025-01-30
Payer: COMMERCIAL

## 2025-01-30 ENCOUNTER — OFFICE VISIT (OUTPATIENT)
Dept: INTERPRETER SERVICES | Facility: CLINIC | Age: 60
End: 2025-01-30

## 2025-01-30 VITALS
WEIGHT: 141 LBS | SYSTOLIC BLOOD PRESSURE: 131 MMHG | HEART RATE: 74 BPM | TEMPERATURE: 98.3 F | HEIGHT: 58 IN | BODY MASS INDEX: 29.6 KG/M2 | DIASTOLIC BLOOD PRESSURE: 89 MMHG | RESPIRATION RATE: 16 BRPM | OXYGEN SATURATION: 97 %

## 2025-01-30 DIAGNOSIS — R87.810 CERVICAL HIGH RISK HUMAN PAPILLOMAVIRUS (HPV) DNA TEST POSITIVE: Primary | ICD-10-CM

## 2025-01-30 DIAGNOSIS — N89.8 VAGINAL DISCHARGE: ICD-10-CM

## 2025-01-30 LAB
CLUE CELLS: ABNORMAL
TRICHOMONAS, WET PREP: ABNORMAL
WBC'S/HIGH POWER FIELD, WET PREP: ABNORMAL
YEAST, WET PREP: PRESENT

## 2025-01-30 NOTE — PROGRESS NOTES
"  {PROVIDER CHARTING PREFERENCE:916529}    Subjective    is a 59 year old, presenting for the following health issues:  Colposcopy      1/30/2025    10:56 AM   Additional Questions   Roomed by Rudy PATEL   Accompanied by self     HPI     {MA/LPN/RN Pre-Provider Visit Orders- hCG/UA/Strep (Optional):533145}  {SUPERLIST (Optional):655850}  {additonal problems for provider to add (Optional):037008}    {ROS Picklists (Optional):601613}      Objective    /89 (BP Location: Left arm, Patient Position: Sitting, Cuff Size: Adult Large)   Pulse 74   Temp 98.3  F (36.8  C) (Oral)   Resp 16   Ht 1.465 m (4' 9.68\")   Wt 64 kg (141 lb)   LMP  (LMP Unknown)   SpO2 97%   BMI 29.80 kg/m    Body mass index is 29.8 kg/m .  Physical Exam   {Exam List (Optional):954521}    {Diagnostic Test Results (Optional):311081}        Signed Electronically by: Shae Sanchez MD  {Email feedback regarding this note to [email or web address]   :148359}    Prior to immunization administration, verified patients identity using patient s name and date of birth. Please see Immunization Activity for additional information.     Screening Questionnaire for Adult Immunization    Are you sick today?   No   Do you have allergies to medications, food, a vaccine component or latex?   No   Have you ever had a serious reaction after receiving a vaccination?   No   Do you have a long-term health problem with heart, lung, kidney, or metabolic disease (e.g., diabetes), asthma, a blood disorder, no spleen, complement component deficiency, a cochlear implant, or a spinal fluid leak?  Are you on long-term aspirin therapy?   No   Do you have cancer, leukemia, HIV/AIDS, or any other immune system problem?   No   Do you have a parent, brother, or sister with an immune system problem?   No   In the past 3 months, have you taken medications that affect  your immune system, such as prednisone, other steroids, or anticancer drugs; " drugs for the treatment of rheumatoid arthritis, Crohn s disease, or psoriasis; or have you had radiation treatments?   No   Have you had a seizure, or a brain or other nervous system problem?   No   During the past year, have you received a transfusion of blood or blood    products, or been given immune (gamma) globulin or antiviral drug?   No   For women: Are you pregnant or is there a chance you could become       pregnant during the next month?   No   Have you received any vaccinations in the past 4 weeks?   No     Immunization questionnaire answers were all negative.      Patient instructed to remain in clinic for 15 minutes afterwards, and to report any adverse reactions.     Screening performed by Rudy Maldonado MA on 1/30/2025 at 11:04 AM.

## 2025-01-30 NOTE — PROGRESS NOTES
Se Menendez is a 59 year old female who presents for initial colposcopy, referred by Dr. Stratton. Pap smear 1 months ago showed: NILM with positive high risk HPV (not 16 or 18). The prior pap showed the same results in 2023.     No past medical history on file.  Family History   Problem Relation Age of Onset    Diabetes No family hx of     Cerebrovascular Disease No family hx of     Acute Myocardial Infarction No family hx of     Breast Cancer No family hx of     Ovarian Cancer No family hx of     Colon Cancer No family hx of        Previous history of abnormal paps?: No  History of cryotherapy (freezing)?: : No  History of veneral diseases: : No  Do you desire testing for any of these diseases? : No  History of genital warts:  No     No LMP recorded (lmp unknown). Patient is postmenopausal.  Type of contraception: post menopausal status  Age at first sexual intercourse: unknown  Number of sexual partners (lifetime): unknown    History   Smoking Status    Never   Smokeless Tobacco    Never     Allergies as of 01/30/2025    (No Known Allergies)        PROCEDURE:  Before the procedure, it was ensured that the patient was educated regarding the nature of her findings to date, the implications of them, and what was to be done. She has been made aware of the role of HPV, the natural history of infection, ways to minimize her future risk, the effect of HPV on the cervix, and treatment options available should they be indicated. The pathophysiology of the cervix, including a discussion of squamous vs. endometrial cells, and squamous metaplasia have all been reviewed, using illustrations and sketches. The details of the colposcopic procedure were reviewed, as well as the risks of missed diagnoses, pain, infection and bleeding. All questions were answered before proceeding, and informed consent was therefore obtained.    Bimanual examination: was not done  Unenhanced examination of the cervix was normal without lesions.    Pap  repeated?:  No  SCJ seen?:  yes  Endocervical speculum needed?:  No  ECC done?:  Yes - due to high risk HPV and age 59  Lugol's solution used?:  No  Satisfactory examination?:  yes    Vaginal vault: normal to cursory inspection with lax sidewalls  Urethra normal?:  yes  Labia normal?:  yes  Perineum normal?:  yes  Rectum normal?:  yes    FINDINGS:  Please see image   Cervix: no visible lesions  Procedure: biopsies taken (not including ECC): 0.    Procedure summary: Patient tolerated procedure well     Assessment: Normal exam without visible pathology, moderate yellow/white vaginal discharge - collected wet prep and chlamydia/gonorrhea to further evaluate  Rodolfo index:      Plan: Specimens labelled and sent to pathology.    Shae Sanchez MD  [facility]

## 2025-02-03 ENCOUNTER — TELEPHONE (OUTPATIENT)
Dept: FAMILY MEDICINE | Facility: CLINIC | Age: 60
End: 2025-02-03
Payer: COMMERCIAL

## 2025-02-03 ENCOUNTER — DOCUMENTATION ONLY (OUTPATIENT)
Dept: FAMILY MEDICINE | Facility: CLINIC | Age: 60
End: 2025-02-03
Payer: COMMERCIAL

## 2025-02-03 DIAGNOSIS — R87.810 CERVICAL HIGH RISK HPV (HUMAN PAPILLOMAVIRUS) TEST POSITIVE: Primary | ICD-10-CM

## 2025-02-03 LAB
PATH REPORT.COMMENTS IMP SPEC: NORMAL
PATH REPORT.COMMENTS IMP SPEC: NORMAL
PATH REPORT.FINAL DX SPEC: NORMAL
PATH REPORT.GROSS SPEC: NORMAL
PATH REPORT.MICROSCOPIC SPEC OTHER STN: NORMAL
PATH REPORT.RELEVANT HX SPEC: NORMAL
PHOTO IMAGE: NORMAL

## 2025-02-03 RX ORDER — FLUCONAZOLE 150 MG/1
150 TABLET ORAL ONCE
COMMUNITY
Start: 2025-01-31

## 2025-02-03 NOTE — TELEPHONE ENCOUNTER
Spoke to pt in [facility], relayed clinician's message and recommendations. Pt verbalized understanding and agreed with plans and has no questions or concerns. No further action required.     Aurelia RANKIN RN  [facility]        ----- Message from Shae Sanchez sent at 2/3/2025  2:26 PM CST -----  Please call with results. Patient has [employer] but has not logged in since August.    Se,    Your cervix scraping showed completely normal cells. Great news! I would recommend a regular pap smear in 1 year to see if the HPV infection has gone away.     If you have any questions or concerns, please let me know.    Dr. Sanchez

## 2025-02-12 ENCOUNTER — PATIENT OUTREACH (OUTPATIENT)
Dept: FAMILY MEDICINE | Facility: CLINIC | Age: 60
End: 2025-02-12
Payer: COMMERCIAL

## 2025-02-12 DIAGNOSIS — R87.810 CERVICAL HIGH RISK HPV (HUMAN PAPILLOMAVIRUS) TEST POSITIVE: Primary | ICD-10-CM

## 2025-02-13 ENCOUNTER — OFFICE VISIT (OUTPATIENT)
Dept: FAMILY MEDICINE | Facility: CLINIC | Age: 60
End: 2025-02-13
Payer: COMMERCIAL

## 2025-02-13 VITALS
OXYGEN SATURATION: 98 % | DIASTOLIC BLOOD PRESSURE: 80 MMHG | HEART RATE: 81 BPM | RESPIRATION RATE: 16 BRPM | BODY MASS INDEX: 29.93 KG/M2 | HEIGHT: 58 IN | SYSTOLIC BLOOD PRESSURE: 122 MMHG | WEIGHT: 142.6 LBS | TEMPERATURE: 98.2 F

## 2025-02-13 DIAGNOSIS — H73.91 ABNORMAL TYMPANIC MEMBRANE OF RIGHT EAR: Primary | ICD-10-CM

## 2025-02-13 RX ORDER — CIPROFLOXACIN AND DEXAMETHASONE 3; 1 MG/ML; MG/ML
4 SUSPENSION/ DROPS AURICULAR (OTIC) 2 TIMES DAILY
Qty: 7.5 ML | Refills: 0 | Status: SHIPPED | OUTPATIENT
Start: 2025-02-13 | End: 2025-02-20

## 2025-02-13 NOTE — PROGRESS NOTES
"  Assessment & Plan     Abnormal tympanic membrane of right ear  Pain, tinnitus, decreased hearing for one week. TM does not appear consistent with AOM, but does appear abnormal. Unclear if TM rupture or external otitis. Will treat w ciprodex and refer to ENT for evaluation. As I suspect it may take a while to get into ENT, I recommend she also follow up here in 2 weeks for recheck of right ear, and then it can be determined if ENT appt needs to be moved up. She was advised to keep follow up appt even if symptoms have resolved, so the ear can be re-examined.   - ciprofloxacin-dexAMETHasone (CIPRODEX) 0.3-0.1 % otic suspension  Dispense: 7.5 mL; Refill: 0  - Adult ENT  Referral          Subjective   Se is a 59 year old, presenting for the following health issues:  Ear Problem (Right ear pain )      2/13/2025    10:44 AM   Additional Questions   Roomed by kimberli roper   Accompanied by Self     Right ear pain.  Over a week, ear is \"weird\" feels heavy. Ringing for 3 days, decreased hearing. No drainage. Pressure/pain. Reports symptoms like this 1-2 times/year, resolves w time and tylenol, but this time a bit different, and not resolving.  looked and saw something red/shiny in ear.     URI in early January completely resolved. History of allergic rhinitis per chart, not currently taking zyrtec.     ENT June 2024                  Objective    /80   Pulse 81   Temp 98.2  F (36.8  C) (Oral)   Resp 16   Ht 1.475 m (4' 10.07\")   Wt 64.7 kg (142 lb 9.6 oz)   LMP  (LMP Unknown)   SpO2 98%   BMI 29.73 kg/m    Body mass index is 29.73 kg/m .  Physical Exam     Gen: well appearing, NAD  HEENT: PERRL. Left EAC and TM clear. Right EAC clear. Abnormal yellow tissue and possibly fluid filled bleb appears to protruding from right TM. Tiny area of scab/recent bleeding. Remainder of TM normal without erythema. No perforation seen, but unable to visualize entire right TM. No mastoid tenderness.   Neck: supple, " no LAD            Signed Electronically by: Ameena Isaacs MD

## 2025-02-27 ENCOUNTER — OFFICE VISIT (OUTPATIENT)
Dept: FAMILY MEDICINE | Facility: CLINIC | Age: 60
End: 2025-02-27
Payer: COMMERCIAL

## 2025-02-27 VITALS
SYSTOLIC BLOOD PRESSURE: 128 MMHG | WEIGHT: 141 LBS | RESPIRATION RATE: 15 BRPM | BODY MASS INDEX: 29.6 KG/M2 | DIASTOLIC BLOOD PRESSURE: 89 MMHG | OXYGEN SATURATION: 98 % | TEMPERATURE: 98.1 F | HEART RATE: 70 BPM | HEIGHT: 58 IN

## 2025-02-27 DIAGNOSIS — H92.01 RIGHT EAR PAIN: Primary | ICD-10-CM

## 2025-02-27 RX ORDER — CIPROFLOXACIN AND DEXAMETHASONE 3; 1 MG/ML; MG/ML
4 SUSPENSION/ DROPS AURICULAR (OTIC) 2 TIMES DAILY
Qty: 7.5 ML | Refills: 0 | Status: SHIPPED | OUTPATIENT
Start: 2025-02-27 | End: 2025-03-06

## 2025-02-27 NOTE — PROGRESS NOTES
"  Assessment & Plan     Right ear pain  Significantly improved with ciprodex, but still painful if she pushes on earlobe. On exam, there is some crusted material stuck to the EAC - unable to remove with lighted curette. Recommend one more week of ciprodex to hopefully soften the crusted area. She is scheduled for ENT in May, advised her to keep that appt even if back to baseline.   - ciprofloxacin-dexAMETHasone (CIPRODEX) 0.3-0.1 % otic suspension  Dispense: 7.5 mL; Refill: 0            Subjective    is a 59 year old, presenting for the following health issues:  RECHECK EAR       2/27/2025     9:05 AM   Additional Questions   Roomed by SRINIVAS WILLIAMSON CMA   Accompanied by None     HPI     Here for right ear recheck. 2 weeks ago was here w ear complaints, abnl ear exam, treated w ciprodex. Ear is better- pain is gone, unless she pushes/pulls on earlobe, then still some pain. \"Heaviness\" in ear is better.                   Objective    /89 (BP Location: Right arm, Patient Position: Sitting, Cuff Size: Adult Regular)   Pulse 70   Temp 98.1  F (36.7  C) (Oral)   Resp 15   Ht 1.473 m (4' 10\")   Wt 64 kg (141 lb)   LMP  (LMP Unknown)   SpO2 98%   BMI 29.47 kg/m    Body mass index is 29.47 kg/m .  Physical Exam     HEENT: right EAC clear except in one area, adhered to canal, there is crusting and scab. TM appears normal, but only partially visualized. No mastoid tenderness.             Signed Electronically by: Ameena Isaacs MD    "

## 2025-05-19 ENCOUNTER — PATIENT OUTREACH (OUTPATIENT)
Dept: CARE COORDINATION | Facility: CLINIC | Age: 60
End: 2025-05-19
Payer: COMMERCIAL

## 2025-05-26 NOTE — PROGRESS NOTES
Chief Complaint   Patient presents with    Ear Fullness     PT report that she has otalgia, ear fullness, and decreased hearing of the right ear. Started in Feb 2025.      History of Present Illness   Se Menendez is a 59 year old female who presents today for evaluation. I am seeing this patient in consultation for abnormal tympanic membrane of right ear at the request of the provider Dr. Ameena Isaacs.  The patient was seen, examined, and counseled today with the help of an interpretor.  The patient reports developing right ear discomfort, decreased hearing, ear fullness, and drainage several months ago.  The drainage subsided but the ears felt a bit full.  She is no longer having sudarshan pain but does have a dull ache.  She will sometimes have discomfort if she presses on the area.  No fluctuating hearing loss, dizziness, tinnitus, vertigo.  No prior history of ear surgery.     Past Medical History  Patient Active Problem List   Diagnosis    Benign essential hypertension    Non-seasonal allergic rhinitis    Cervical high risk HPV (human papillomavirus) test positive    Hepatic steatosis     Current Medications     Current Outpatient Medications:     acetaminophen (TYLENOL) 500 MG tablet, [ACETAMINOPHEN (TYLENOL) 500 MG TABLET] TAKE 2 TABLETS(1000 MG) BY MOUTH EVERY 4 HOURS AS NEEDED FOR PAIN, Disp: 100 tablet, Rfl: 5    cetirizine (ZYRTEC) 10 MG tablet, Take 1 tablet (10 mg) by mouth daily (Patient not taking: Reported on 5/22/2024), Disp: 90 tablet, Rfl: 1    famotidine (PEPCID) 20 MG tablet, Take 1 tablet (20 mg) by mouth 2 times daily as needed (stomach upset)., Disp: 20 tablet, Rfl: 5    fluconazole (DIFLUCAN) 150 MG tablet, Take 150 mg by mouth once., Disp: , Rfl:     losartan (COZAAR) 100 MG tablet, Take 1 tablet (100 mg) by mouth daily., Disp: 90 tablet, Rfl: 4    Allergies  No Known Allergies    Social History   Social History     Socioeconomic History    Marital status:    Tobacco Use    Smoking status:  Never     Passive exposure: Never    Smokeless tobacco: Never   Vaping Use    Vaping status: Never Used     Social Drivers of Health     Financial Resource Strain: Low Risk  (1/30/2025)    Financial Resource Strain     Within the past 12 months, have you or your family members you live with been unable to get utilities (heat, electricity) when it was really needed?: No   Food Insecurity: Low Risk  (1/30/2025)    Food Insecurity     Within the past 12 months, did you worry that your food would run out before you got money to buy more?: No     Within the past 12 months, did the food you bought just not last and you didn t have money to get more?: No   Transportation Needs: Low Risk  (1/30/2025)    Transportation Needs     Within the past 12 months, has lack of transportation kept you from medical appointments, getting your medicines, non-medical meetings or appointments, work, or from getting things that you need?: No   Interpersonal Safety: Low Risk  (12/17/2024)    Interpersonal Safety     Do you feel physically and emotionally safe where you currently live?: Yes     Within the past 12 months, have you been hit, slapped, kicked or otherwise physically hurt by someone?: No     Within the past 12 months, have you been humiliated or emotionally abused in other ways by your partner or ex-partner?: No   Housing Stability: Low Risk  (1/30/2025)    Housing Stability     Do you have housing? : Yes     Are you worried about losing your housing?: No       Family History  Family History   Problem Relation Age of Onset    Diabetes No family hx of     Cerebrovascular Disease No family hx of     Acute Myocardial Infarction No family hx of     Breast Cancer No family hx of     Ovarian Cancer No family hx of     Colon Cancer No family hx of        Review of Systems  As per HPI and PMHx, otherwise 10+ comprehensive system review is negative.    Physical Exam  LMP  (LMP Unknown)   GENERAL: The patient is a pleasant, cooperative 59  year old female in no acute distress.  HEAD: Normocephalic, atraumatic. Hair and scalp are normal.  EYES: Pupils are equal, round, reactive to light and accommodation. Extraocular movements are intact. The sclera nonicteric without injection. The extraocular structures are normal.  EARS: See below.  NOSE: Nares are patent.  Nasal mucosa is pink and moist.  Negative anterior rhinoscopy.  NEUROLOGIC: Cranial nerves II through XII are grossly intact. Voice is strong. Patient is House-Brackmann I/VI bilaterally.  CARDIOVASCULAR: Extremities are warm and well-perfused. No significant peripheral edema.  RESPIRATORY: Patient has nonlabored breathing without cough, wheeze, stridor.  PSYCHIATRIC: Patient is alert and oriented. Mood and affect appear normal.  SKIN: Warm and dry. No scalp, face, or neck lesions noted.    Physical Exam and Procedure    EARS: Normal shape and symmetry. No tenderness when palpating the mastoid or tragal areas bilaterally. No mastoid erythema or fluctuance. The ears were then examined under the otic binocular microscope to perform cerumen removal.  Otoscopic exam on the right reveals impacted cerumen down to the level of the tympanic membrane.  The cerumen was removed with an alligator forceps.  The right tympanic membrane was round, intact without evidence of effusion.  No retraction, granulation, drainage, or evidence of cholesteatoma.      Attention was turned to the left ear. Otoscopic exam on the left reveals a clear canal.  The left tympanic membrane was round, intact without evidence of effusion. No retraction, granulation, drainage, or evidence of cholesteatoma.      Audiogram  The patient underwent an audiogram performed today. My review of the audiogram showed borderline normal hearing to 6000 Hz sloping to moderate high-frequency sensorineural hearing loss in both ears. Pure-tone average was 18 dB on the right and 17 dB on the left. Speech reception threshold was 20 dB on the right and  10 dB on the left. The patient had 100% word recognition on the right and 100% word recognition on the left. The patient had a type A tympanogram on the right and a type A tympanogram on the left.    Assessment and Plan     ICD-10-CM    1. History of acute otitis media  Z86.69 Adult Audiology  Referral     Microscopy, Binocular     Adult ENT  Referral      2. Normal hearing exam  Z01.10 Adult Audiology  Referral     Microscopy, Binocular      3. Excessive cerumen in right ear canal  H61.21         It was my pleasure seeing Se Menendez today in clinic.  The patient was seen, examined, and counseled today with the help of an interpretor.  The patient presents today with recent history of right ear discomfort, pain, decreased hearing, and drainage.  She had some debris in the right ear canal today that I removed in office.  Her right tympanic membrane appears to be intact without evidence of effusion.  She does have some mild to moderate sensorineural hearing loss across a few frequencies but nothing that would require amplification at this time.  We spent the remainder of today's visit on education. We discussed hearing protection in noisy environments.    The patient will follow up as necessary for worsening symptoms or changes in symptoms. I have also recommended repeat audiogram in 1-3 years, or sooner if symptoms warrant.      Savage Barrera MD  Department of Otolaryngology-Head and Neck Surgery  [facility]

## 2025-05-27 ENCOUNTER — OFFICE VISIT (OUTPATIENT)
Dept: AUDIOLOGY | Facility: CLINIC | Age: 60
End: 2025-05-27
Attending: FAMILY MEDICINE
Payer: COMMERCIAL

## 2025-05-27 ENCOUNTER — OFFICE VISIT (OUTPATIENT)
Dept: OTOLARYNGOLOGY | Facility: CLINIC | Age: 60
End: 2025-05-27
Attending: FAMILY MEDICINE
Payer: COMMERCIAL

## 2025-05-27 DIAGNOSIS — Z01.10 NORMAL HEARING EXAM: ICD-10-CM

## 2025-05-27 DIAGNOSIS — H92.01 PAIN IN RIGHT EAR: ICD-10-CM

## 2025-05-27 DIAGNOSIS — Z86.69 HISTORY OF ACUTE OTITIS MEDIA: ICD-10-CM

## 2025-05-27 DIAGNOSIS — Z86.69 HISTORY OF ACUTE OTITIS MEDIA: Primary | ICD-10-CM

## 2025-05-27 DIAGNOSIS — H61.21 EXCESSIVE CERUMEN IN RIGHT EAR CANAL: ICD-10-CM

## 2025-05-27 DIAGNOSIS — H90.3 SENSORINEURAL HEARING LOSS (SNHL) OF BOTH EARS: Primary | ICD-10-CM

## 2025-05-27 DIAGNOSIS — H93.12 TINNITUS OF LEFT EAR: ICD-10-CM

## 2025-05-27 PROCEDURE — T1013 SIGN LANG/ORAL INTERPRETER: HCPCS | Performed by: INTERPRETER

## 2025-05-27 PROCEDURE — 92557 COMPREHENSIVE HEARING TEST: CPT | Performed by: AUDIOLOGIST

## 2025-05-27 PROCEDURE — 92567 TYMPANOMETRY: CPT | Performed by: AUDIOLOGIST

## 2025-05-27 NOTE — LETTER
5/27/2025      Se Menendez  [address] [place] [address]      Dear Colleague,    Thank you for referring your patient, Se Menendez, to the [facility]. Please see a copy of my visit note below.    Chief Complaint   Patient presents with     Ear Fullness     PT report that she has otalgia, ear fullness, and decreased hearing of the right ear. Started in Feb 2025.      History of Present Illness   Se Menendez is a 59 year old female who presents today for evaluation. I am seeing this patient in consultation for abnormal tympanic membrane of right ear at the request of the provider Dr. Ameena Isaacs.  The patient was seen, examined, and counseled today with the help of an interpretor.  The patient reports developing right ear discomfort, decreased hearing, ear fullness, and drainage several months ago.  The drainage subsided but the ears felt a bit full.  She is no longer having sudarshan pain but does have a dull ache.  She will sometimes have discomfort if she presses on the area.  No fluctuating hearing loss, dizziness, tinnitus, vertigo.  No prior history of ear surgery.     Past Medical History  Patient Active Problem List   Diagnosis     Benign essential hypertension     Non-seasonal allergic rhinitis     Cervical high risk HPV (human papillomavirus) test positive     Hepatic steatosis     Current Medications     Current Outpatient Medications:      acetaminophen (TYLENOL) 500 MG tablet, [ACETAMINOPHEN (TYLENOL) 500 MG TABLET] TAKE 2 TABLETS(1000 MG) BY MOUTH EVERY 4 HOURS AS NEEDED FOR PAIN, Disp: 100 tablet, Rfl: 5     cetirizine (ZYRTEC) 10 MG tablet, Take 1 tablet (10 mg) by mouth daily (Patient not taking: Reported on 5/22/2024), Disp: 90 tablet, Rfl: 1     famotidine (PEPCID) 20 MG tablet, Take 1 tablet (20 mg) by mouth 2 times daily as needed (stomach upset)., Disp: 20 tablet, Rfl: 5     fluconazole (DIFLUCAN) 150 MG tablet, Take 150 mg by mouth once., Disp: , Rfl:      losartan  (COZAAR) 100 MG tablet, Take 1 tablet (100 mg) by mouth daily., Disp: 90 tablet, Rfl: 4    Allergies  No Known Allergies    Social History   Social History     Socioeconomic History     Marital status:    Tobacco Use     Smoking status: Never     Passive exposure: Never     Smokeless tobacco: Never   Vaping Use     Vaping status: Never Used     Social Drivers of Health     Financial Resource Strain: Low Risk  (1/30/2025)    Financial Resource Strain      Within the past 12 months, have you or your family members you live with been unable to get utilities (heat, electricity) when it was really needed?: No   Food Insecurity: Low Risk  (1/30/2025)    Food Insecurity      Within the past 12 months, did you worry that your food would run out before you got money to buy more?: No      Within the past 12 months, did the food you bought just not last and you didn t have money to get more?: No   Transportation Needs: Low Risk  (1/30/2025)    Transportation Needs      Within the past 12 months, has lack of transportation kept you from medical appointments, getting your medicines, non-medical meetings or appointments, work, or from getting things that you need?: No   Interpersonal Safety: Low Risk  (12/17/2024)    Interpersonal Safety      Do you feel physically and emotionally safe where you currently live?: Yes      Within the past 12 months, have you been hit, slapped, kicked or otherwise physically hurt by someone?: No      Within the past 12 months, have you been humiliated or emotionally abused in other ways by your partner or ex-partner?: No   Housing Stability: Low Risk  (1/30/2025)    Housing Stability      Do you have housing? : Yes      Are you worried about losing your housing?: No       Family History  Family History   Problem Relation Age of Onset     Diabetes No family hx of      Cerebrovascular Disease No family hx of      Acute Myocardial Infarction No family hx of      Breast Cancer No family hx of       Ovarian Cancer No family hx of      Colon Cancer No family hx of        Review of Systems  As per HPI and PMHx, otherwise 10+ comprehensive system review is negative.    Physical Exam  LMP  (LMP Unknown)   GENERAL: The patient is a pleasant, cooperative 59 year old female in no acute distress.  HEAD: Normocephalic, atraumatic. Hair and scalp are normal.  EYES: Pupils are equal, round, reactive to light and accommodation. Extraocular movements are intact. The sclera nonicteric without injection. The extraocular structures are normal.  EARS: See below.  NOSE: Nares are patent.  Nasal mucosa is pink and moist.  Negative anterior rhinoscopy.  NEUROLOGIC: Cranial nerves II through XII are grossly intact. Voice is strong. Patient is House-Brackmann I/VI bilaterally.  CARDIOVASCULAR: Extremities are warm and well-perfused. No significant peripheral edema.  RESPIRATORY: Patient has nonlabored breathing without cough, wheeze, stridor.  PSYCHIATRIC: Patient is alert and oriented. Mood and affect appear normal.  SKIN: Warm and dry. No scalp, face, or neck lesions noted.    Physical Exam and Procedure    EARS: Normal shape and symmetry. No tenderness when palpating the mastoid or tragal areas bilaterally. No mastoid erythema or fluctuance. The ears were then examined under the otic binocular microscope to perform cerumen removal.  Otoscopic exam on the right reveals impacted cerumen down to the level of the tympanic membrane.  The cerumen was removed with an alligator forceps.  The right tympanic membrane was round, intact without evidence of effusion.  No retraction, granulation, drainage, or evidence of cholesteatoma.      Attention was turned to the left ear. Otoscopic exam on the left reveals a clear canal.  The left tympanic membrane was round, intact without evidence of effusion. No retraction, granulation, drainage, or evidence of cholesteatoma.      Audiogram  The patient underwent an audiogram performed today. My  review of the audiogram showed borderline normal hearing to 6000 Hz sloping to moderate high-frequency sensorineural hearing loss in both ears. Pure-tone average was 18 dB on the right and 17 dB on the left. Speech reception threshold was 20 dB on the right and 10 dB on the left. The patient had 100% word recognition on the right and 100% word recognition on the left. The patient had a type A tympanogram on the right and a type A tympanogram on the left.    Assessment and Plan     ICD-10-CM    1. History of acute otitis media  Z86.69 Adult Audiology  Referral     Microscopy, Binocular     Adult ENT  Referral      2. Normal hearing exam  Z01.10 Adult Audiology  Referral     Microscopy, Binocular      3. Excessive cerumen in right ear canal  H61.21         It was my pleasure seeing Se Menendez today in clinic.  The patient was seen, examined, and counseled today with the help of an interpretor.  The patient presents today with recent history of right ear discomfort, pain, decreased hearing, and drainage.  She had some debris in the right ear canal today that I removed in office.  Her right tympanic membrane appears to be intact without evidence of effusion.  She does have some mild to moderate sensorineural hearing loss across a few frequencies but nothing that would require amplification at this time.  We spent the remainder of today's visit on education. We discussed hearing protection in noisy environments.    The patient will follow up as necessary for worsening symptoms or changes in symptoms. I have also recommended repeat audiogram in 1-3 years, or sooner if symptoms warrant.      Savage Barrera MD  Department of Otolaryngology-Head and Neck Surgery  [facility]     Again, thank you for allowing me to participate in the care of your patient.        Sincerely,        Savage Barrera MD    Electronically signed

## 2025-05-27 NOTE — PROGRESS NOTES
AUDIOLOGY REPORT     SUMMARY: Audiology visit completed. See audiogram for results.       RECOMMENDATIONS: Follow-up with ENT.    Jen Taveras, CCC-A  [place] Licensed Audiologist #[id number]

## 2025-05-28 ENCOUNTER — PATIENT OUTREACH (OUTPATIENT)
Dept: CARE COORDINATION | Facility: CLINIC | Age: 60
End: 2025-05-28
Payer: COMMERCIAL